# Patient Record
Sex: FEMALE | Race: BLACK OR AFRICAN AMERICAN | NOT HISPANIC OR LATINO | URBAN - METROPOLITAN AREA
[De-identification: names, ages, dates, MRNs, and addresses within clinical notes are randomized per-mention and may not be internally consistent; named-entity substitution may affect disease eponyms.]

---

## 2018-04-03 ENCOUNTER — EMERGENCY (EMERGENCY)
Facility: HOSPITAL | Age: 28
LOS: 1 days | Discharge: HOME | End: 2018-04-03
Attending: EMERGENCY MEDICINE | Admitting: EMERGENCY MEDICINE
Payer: SELF-PAY

## 2018-04-03 VITALS
DIASTOLIC BLOOD PRESSURE: 77 MMHG | TEMPERATURE: 98 F | OXYGEN SATURATION: 100 % | SYSTOLIC BLOOD PRESSURE: 119 MMHG | HEART RATE: 90 BPM | RESPIRATION RATE: 17 BRPM

## 2018-04-03 VITALS
HEART RATE: 70 BPM | DIASTOLIC BLOOD PRESSURE: 78 MMHG | TEMPERATURE: 98 F | SYSTOLIC BLOOD PRESSURE: 124 MMHG | OXYGEN SATURATION: 100 % | RESPIRATION RATE: 16 BRPM

## 2018-04-03 LAB
BASOPHILS # BLD AUTO: 0 K/UL — SIGNIFICANT CHANGE UP (ref 0–0.2)
BASOPHILS NFR BLD AUTO: 0.6 % — SIGNIFICANT CHANGE UP (ref 0–2)
EOSINOPHIL # BLD AUTO: 0.1 K/UL — SIGNIFICANT CHANGE UP (ref 0–0.5)
EOSINOPHIL NFR BLD AUTO: 2.3 % — SIGNIFICANT CHANGE UP (ref 0–6)
HCG SERPL QL: NEGATIVE — SIGNIFICANT CHANGE UP
HCT VFR BLD CALC: 40.2 % — SIGNIFICANT CHANGE UP (ref 34.5–45)
HGB BLD-MCNC: 13.3 G/DL — SIGNIFICANT CHANGE UP (ref 11.5–15.5)
LYMPHOCYTES # BLD AUTO: 2.1 K/UL — SIGNIFICANT CHANGE UP (ref 1–3.3)
LYMPHOCYTES # BLD AUTO: 39.5 % — SIGNIFICANT CHANGE UP (ref 13–44)
MCHC RBC-ENTMCNC: 28.2 PG — SIGNIFICANT CHANGE UP (ref 27–34)
MCHC RBC-ENTMCNC: 33 GM/DL — SIGNIFICANT CHANGE UP (ref 32–36)
MCV RBC AUTO: 85.4 FL — SIGNIFICANT CHANGE UP (ref 80–100)
MONOCYTES # BLD AUTO: 0.5 K/UL — SIGNIFICANT CHANGE UP (ref 0–0.9)
MONOCYTES NFR BLD AUTO: 9.2 % — SIGNIFICANT CHANGE UP (ref 2–14)
NEUTROPHILS # BLD AUTO: 2.5 K/UL — SIGNIFICANT CHANGE UP (ref 1.8–7.4)
NEUTROPHILS NFR BLD AUTO: 48.3 % — SIGNIFICANT CHANGE UP (ref 43–77)
PLATELET # BLD AUTO: 275 K/UL — SIGNIFICANT CHANGE UP (ref 150–400)
RBC # BLD: 4.71 M/UL — SIGNIFICANT CHANGE UP (ref 3.8–5.2)
RBC # FLD: 14.8 % — HIGH (ref 10.3–14.5)
WBC # BLD: 5.2 K/UL — SIGNIFICANT CHANGE UP (ref 3.8–10.5)
WBC # FLD AUTO: 5.2 K/UL — SIGNIFICANT CHANGE UP (ref 3.8–10.5)

## 2018-04-03 PROCEDURE — 85027 COMPLETE CBC AUTOMATED: CPT

## 2018-04-03 PROCEDURE — 99284 EMERGENCY DEPT VISIT MOD MDM: CPT

## 2018-04-03 PROCEDURE — 96375 TX/PRO/DX INJ NEW DRUG ADDON: CPT

## 2018-04-03 PROCEDURE — 96374 THER/PROPH/DIAG INJ IV PUSH: CPT

## 2018-04-03 PROCEDURE — 84703 CHORIONIC GONADOTROPIN ASSAY: CPT

## 2018-04-03 PROCEDURE — 99284 EMERGENCY DEPT VISIT MOD MDM: CPT | Mod: 25

## 2018-04-03 RX ORDER — ACETAMINOPHEN 500 MG
1000 TABLET ORAL ONCE
Qty: 0 | Refills: 0 | Status: COMPLETED | OUTPATIENT
Start: 2018-04-03 | End: 2018-04-03

## 2018-04-03 RX ORDER — METOCLOPRAMIDE HCL 10 MG
10 TABLET ORAL ONCE
Qty: 0 | Refills: 0 | Status: COMPLETED | OUTPATIENT
Start: 2018-04-03 | End: 2018-04-03

## 2018-04-03 RX ORDER — SODIUM CHLORIDE 9 MG/ML
1000 INJECTION INTRAMUSCULAR; INTRAVENOUS; SUBCUTANEOUS ONCE
Qty: 0 | Refills: 0 | Status: COMPLETED | OUTPATIENT
Start: 2018-04-03 | End: 2018-04-03

## 2018-04-03 RX ADMIN — Medication 10 MILLIGRAM(S): at 15:30

## 2018-04-03 RX ADMIN — SODIUM CHLORIDE 1000 MILLILITER(S): 9 INJECTION INTRAMUSCULAR; INTRAVENOUS; SUBCUTANEOUS at 15:30

## 2018-04-03 RX ADMIN — Medication 400 MILLIGRAM(S): at 15:30

## 2018-04-03 NOTE — ED PROVIDER NOTE - CARE PLAN
Principal Discharge DX:	Menorrhagia, premenopausal  Goal:	Follow-up with your PMD and Gynecologist  Assessment and plan of treatment:	Your presented to the emergency department with increased vaginal bleeding during your periods. No concerning abnormalities were found on your pelvic exam, you remained hemodynamically stable, and your blood test demonstrated a Hemoglobin of 13.3. Please follow-up with your gynecologist regarding any additional work-up and treatment options.

## 2018-04-03 NOTE — ED PROVIDER NOTE - PLAN OF CARE
Follow-up with your PMD and Gynecologist Your presented to the emergency department with increased vaginal bleeding during your periods. No concerning abnormalities were found on your pelvic exam, you remained hemodynamically stable, and your blood test demonstrated a Hemoglobin of 13.3. Please follow-up with your gynecologist regarding any additional work-up and treatment options.

## 2018-04-03 NOTE — ED PROVIDER NOTE - OBJECTIVE STATEMENT
28F no significant PMH presents with vaginal bleeding and headache. Patient has history of 3 child births (1 vaginal, 2 ) and one  s/p D&C in . She notes that for the last 3 months she has had significantly heavier periods. In the past she used ~2 pads at a time and changed ~4x/day. Now she wears 4 heavy pads at a time, changed 5x/day, and has to wear extra undergarments as she often soaks through within 2 hrs. She goes to the bathroom and endorses significant blood and passing clots. She has periods every 21 days, no change in frequency. Is sexually active with her boyfriend of 5 years. Denies discharge other than blood. Currently having her period. Not taking birth control or any medications except for Tylenol/Ibuprophen for headache. She endorses some increased shortness of breath during her training recently as a . Has had a headache since Friday associated with photosensitivity but no phonosensitivity. No fevers, chills, CP, LE swelling, palpitations, abdominal pain, N/V, diarrhea, constipation.

## 2018-04-03 NOTE — ED PROVIDER NOTE - PROGRESS NOTE DETAILS
Blood work returned with stable CBC with Hgb 13.3, MCV 85.4. CHUCK No: Patient was endorsed to me by Dr. cronin at the usual hour of signout to follow up results of symptoms and cbc and dispo pending these results and re-evaluation. At this time the patient ha improved, no neuro signs. h/h stable. reviewed results w pt, advised return precautions.

## 2018-04-03 NOTE — ED ADULT TRIAGE NOTE - CHIEF COMPLAINT QUOTE
heavy vaginal bleeding currently on period- saturating 5 pads in 2 hours. +clots c/o intermittent headache, generalized weakness PMHX ovarian cyst

## 2018-04-03 NOTE — ED ADULT NURSE NOTE - CHPI ED SYMPTOMS NEG
no vaginal discharge/no vomiting/no nausea/no fever/no chills/no back pain/no discharge/no dysuria/no abdominal pain

## 2018-04-03 NOTE — ED PROVIDER NOTE - ATTENDING CONTRIBUTION TO CARE
I have seen and evaluated this patient with the resident.   I agree with the findings  unless other wise stated.  I have made appropriate changes in documentations where needed, After my face to face bedside evaluation, I am further  noting: Patient with menorrhagia for last 2 menstrual cycles clots+ no dizziness no syncope no LOC Abdomen soft nontender no cva tenderness labs reviewed , no concerning pathologic findings, CBC stable and HDS will have out patient GYN f/u --Kelly.

## 2018-04-03 NOTE — ED PROVIDER NOTE - MEDICAL DECISION MAKING DETAILS
Patient with menorrhagia, no concerning pathologic findings, CBC stable and HDS. Patient with menorrhagia for last 2 menstrual cycles clots+ no dizziness no syncope no LOC Abdomen soft nontender no cva tenderness labs reviewed , no concerning pathologic findings, CBC stable and HDS  will have out patient GYN f/u --Kelly.

## 2018-08-08 ENCOUNTER — EMERGENCY (EMERGENCY)
Facility: HOSPITAL | Age: 28
LOS: 1 days | Discharge: HOME | End: 2018-08-08
Attending: EMERGENCY MEDICINE | Admitting: EMERGENCY MEDICINE
Payer: SELF-PAY

## 2018-08-08 VITALS
HEART RATE: 86 BPM | OXYGEN SATURATION: 99 % | TEMPERATURE: 99 F | DIASTOLIC BLOOD PRESSURE: 73 MMHG | RESPIRATION RATE: 18 BRPM | SYSTOLIC BLOOD PRESSURE: 122 MMHG

## 2018-08-08 DIAGNOSIS — Z98.891 HISTORY OF UTERINE SCAR FROM PREVIOUS SURGERY: Chronic | ICD-10-CM

## 2018-08-08 PROCEDURE — 99283 EMERGENCY DEPT VISIT LOW MDM: CPT | Mod: 25

## 2018-08-08 PROCEDURE — 99053 MED SERV 10PM-8AM 24 HR FAC: CPT

## 2018-08-08 RX ORDER — METOCLOPRAMIDE HCL 10 MG
5 TABLET ORAL ONCE
Qty: 0 | Refills: 0 | Status: DISCONTINUED | OUTPATIENT
Start: 2018-08-08 | End: 2018-08-08

## 2018-08-08 RX ORDER — KETOROLAC TROMETHAMINE 30 MG/ML
30 SYRINGE (ML) INJECTION ONCE
Qty: 0 | Refills: 0 | Status: DISCONTINUED | OUTPATIENT
Start: 2018-08-08 | End: 2018-08-08

## 2018-08-08 RX ORDER — METOCLOPRAMIDE HCL 10 MG
10 TABLET ORAL ONCE
Qty: 0 | Refills: 0 | Status: COMPLETED | OUTPATIENT
Start: 2018-08-08 | End: 2018-08-08

## 2018-08-08 RX ADMIN — Medication 30 MILLIGRAM(S): at 05:45

## 2018-08-08 RX ADMIN — Medication 10 MILLIGRAM(S): at 05:45

## 2018-08-08 NOTE — ED PROVIDER NOTE - MEDICAL DECISION MAKING DETAILS
28F hx headaches p/w worse than usual HA preventing her from sleeping. Pain much improved w/ toradol and reglan. Pt feels ready to go home.

## 2018-08-08 NOTE — ED ADULT NURSE NOTE - CHIEF COMPLAINT QUOTE
Patient experiencing headache with nausea and light sensitivity for 3 weeks with some relief with Advil.  No fever or vomiting,  Starting experiencing headaches 5 months ago after graduation from [employer].  LMP: Amandeep 10.

## 2018-08-08 NOTE — ED ADULT NURSE NOTE - OBJECTIVE STATEMENT
pt aox4; reports headache and nausea. 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  MD Crowder at bedside. No labs needed. UCG negative, medications administered as per order. Will continue to monitor/assess

## 2018-08-08 NOTE — ED ADULT TRIAGE NOTE - CHIEF COMPLAINT QUOTE
Patient experiencing headache with nausea and light sensitivity for 3 weeks with some relief with Advil.  No vomiting, fever and vomiting,  Starting experiencing headaches 5 months ago after graduation from [employer].  LMP: Amandeep 10. Patient experiencing headache with nausea and light sensitivity for 3 weeks with some relief with Advil.  No fever or vomiting,  Starting experiencing headaches 5 months ago after graduation from [employer].  LMP: Amandeep 10.

## 2018-08-08 NOTE — ED ADULT NURSE NOTE - NSIMPLEMENTINTERV_GEN_ALL_ED
Implemented All Universal Safety Interventions:  Solo to call system. Call bell, personal items and telephone within reach. Instruct patient to call for assistance. Room bathroom lighting operational. Non-slip footwear when patient is off stretcher. Physically safe environment: no spills, clutter or unnecessary equipment. Stretcher in lowest position, wheels locked, appropriate side rails in place.

## 2018-08-08 NOTE — ED PROVIDER NOTE - OBJECTIVE STATEMENT
28F hx headaches presenting w/ HA since 9pm. Pt w/ hx of headaches over last 5 months, managed well previously on aleve but this time aleve relieved the pain but not the pressure so the pt came to the ED. Since the last 3 weeks, she feels her HA are worse, associated with nausea, and radiating from the front to the back, along with bitemporal pressure sensation, retroorbital pain, and jaw pain. 4 weeks ago, her headaches were 28F hx headaches presenting w/ HA since 9pm. Pt w/ hx of headaches over last 5 months, managed well previously on aleve but this time aleve relieved the pain but not the pressure so the pt came to the ED. Since the last 3 weeks, she feels her HA are worse, associated with nausea, and radiating from the front to the back, along with bitemporal pressure sensation, retroorbital pain, and jaw pain. 4 weeks ago, her headaches were frontal and occasionally radiated to the back, associated with photophobia but not nausea. 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 Pt started OCP 3 months ago. Also reports cramping mid abd pain.  No blurry vision, no recent illness, fevers, neck pain, chest pain, SOB, urinary symptoms.    Uhcg negative. Will give reglan and ketorolac and reassess.

## 2018-08-08 NOTE — ED PROVIDER NOTE - ATTENDING CONTRIBUTION TO CARE
Afebrile. Awake and Alert. Lungs CTA. Heart RRR. Abdomen soft NTND. Neurologic exam: A&O x3, speech clear, BIRGIT, CN II-XII intact, motor strength +5/5 in all extremities, sensation equal bilaterally, finger-to-nose normal, gait steady. Supportive care migraine HA and reassess.

## 2018-08-08 NOTE — ED PROVIDER NOTE - FAMILY HISTORY
Family history of hypertension     Mother  Still living? Unknown  Family history of uterine fibroid, Age at diagnosis: Age Unknown

## 2018-12-14 ENCOUNTER — EMERGENCY (EMERGENCY)
Facility: HOSPITAL | Age: 28
LOS: 1 days | Discharge: HOME | End: 2018-12-14
Attending: PERSONAL EMERGENCY RESPONSE ATTENDANT | Admitting: PERSONAL EMERGENCY RESPONSE ATTENDANT
Payer: COMMERCIAL

## 2018-12-14 VITALS
RESPIRATION RATE: 18 BRPM | SYSTOLIC BLOOD PRESSURE: 131 MMHG | TEMPERATURE: 98 F | OXYGEN SATURATION: 100 % | DIASTOLIC BLOOD PRESSURE: 89 MMHG | HEART RATE: 107 BPM

## 2018-12-14 DIAGNOSIS — Z98.891 HISTORY OF UTERINE SCAR FROM PREVIOUS SURGERY: Chronic | ICD-10-CM

## 2018-12-14 PROCEDURE — 99284 EMERGENCY DEPT VISIT MOD MDM: CPT | Mod: 25

## 2018-12-14 NOTE — ED ADULT TRIAGE NOTE - CHIEF COMPLAINT QUOTE
Pt st" Around 8pm my throat feels tight, my tongue feels heavy and my chest is tight....I ate shrimp and lobster at 7pm don't know if this is cause...never had problem before...my jaw feels tight. " Pt took liquid benedryl and phenylephrine at 8pm

## 2018-12-15 VITALS
DIASTOLIC BLOOD PRESSURE: 72 MMHG | HEART RATE: 77 BPM | TEMPERATURE: 99 F | SYSTOLIC BLOOD PRESSURE: 120 MMHG | OXYGEN SATURATION: 100 % | RESPIRATION RATE: 18 BRPM

## 2018-12-15 RX ORDER — DIPHENHYDRAMINE HCL 50 MG
25 CAPSULE ORAL ONCE
Qty: 0 | Refills: 0 | Status: COMPLETED | OUTPATIENT
Start: 2018-12-15 | End: 2018-12-15

## 2018-12-15 RX ORDER — EPINEPHRINE 0.3 MG/.3ML
0.3 INJECTION INTRAMUSCULAR; SUBCUTANEOUS
Qty: 2 | Refills: 0
Start: 2018-12-15

## 2018-12-15 RX ORDER — FAMOTIDINE 10 MG/ML
20 INJECTION INTRAVENOUS ONCE
Qty: 0 | Refills: 0 | Status: COMPLETED | OUTPATIENT
Start: 2018-12-15 | End: 2018-12-15

## 2018-12-15 RX ADMIN — FAMOTIDINE 20 MILLIGRAM(S): 10 INJECTION INTRAVENOUS at 00:34

## 2018-12-15 RX ADMIN — Medication 40 MILLIGRAM(S): at 00:34

## 2018-12-15 RX ADMIN — Medication 25 MILLIGRAM(S): at 00:34

## 2018-12-15 NOTE — ED PROVIDER NOTE - MEDICAL DECISION MAKING DETAILS
Attending MD Nuñez.  Pt endorses ingestion of shrimp/shellfish prior to onset of sxs.  Also took 'ghost' an energy/preworkout drink for the first time today.  Pt has no hx of all rxn to anything that she knows of.  Discussed strict avoidance of all shellfish and do not take ghost again until such time as she can follow-up with an allergist.  Pt managing secretions without airway compromise or distress at this time.  Will administer allergy cocktail but will defer epipen at this time and observe for improvement.  Epipen teaching performed however in anticipation of discharge with epipen despite lack of admin today for concern for near anaphylactic rxn today and poss worsening with next event.  PT advised re: utilization of epipen.  Advised to utilize if concern for allergic exposure with throat swelling/difficulty breathing, wheezing or rash with vomiting.  Epipen to but applied to mid thigh without need to remove pants or cleanse area.  During or immediately after application 911 should be called for immediate transport to nearest ED.  Any epipen can be used in emergency including  epipen or epipen flavio if these are the only options available.  Regardless of improvement in sxs any epipen use warrants immediate presentation to an ED.  If unsure if use is indicated and breathing without distress have someone else drive you to the ED and bring epipen with you for administration in case of worsening sxs en route.  Carry epipen with you at all times particularly when travelling to remote locations.  Follow-up with allergist formal allergy testing.  Avoid any and all exposures that may have lead to current reaction.  Epipen may be used during breast-feeding/pregnancy for above reasons.

## 2018-12-15 NOTE — ED PROVIDER NOTE - PHYSICAL EXAMINATION
Uvula wnl, bilateral tonsils full, managing secretions, no stridor.  No tongue swelling.  Vocalizing per baseline.     Breath sounds clear to auscultation bilaterally without wheezes/rales/ronchi.  No resp distress.

## 2018-12-15 NOTE — ED ADULT NURSE REASSESSMENT NOTE - NS ED NURSE REASSESS COMMENT FT1
report received at shift change from HILARY Young, pt remains AAOx3, VS as noted, pt in NAD, medicated per orders, respirations even and unlabored, resting comfortably, will continue to monitor pt.

## 2018-12-15 NOTE — ED PROVIDER NOTE - ATTENDING CONTRIBUTION TO CARE
Attending MD Nuñez.  Agree with above.  Pt seen and managed by myself and resident in real time.  Initial documentation completed by me.

## 2018-12-15 NOTE — ED PROVIDER NOTE - OBJECTIVE STATEMENT
Attending MD Nuñez.  Pt is a 27 yo female with pmhx of 'bronchitis' (dxed previously, not currently or recent), no hx of asthma and never used a breathing tx.  No other medical problems or known allergies.  She presents to ED today after she ate some shrimp and shellfish this evening and began to feel throat and chest tightness and 'dry mouth' ~2000.  At ~2200 pt took 10 ml liquid children's diphenhydramine (~25 mg) and went to sleep.  Awoke feeling 'worse' and came to ED.  Endorses persistent feeling of 'throat and chest tightness'.  Denies any itching.  Also endorses some nausea with 'gagging' but denies production of emesis.  Pt is alert and oriented on arrival but anxious. States she has never had similar sxs previously.  Talking without dysphonia, managing secretions.  Breath sounds clear.  Bilateral tonsils full however likely baseline and uvula wnl.  No stridor.  Denies possibility of pregnancy.

## 2018-12-15 NOTE — ED PROVIDER NOTE - PROGRESS NOTE DETAILS
Demetri Bo MD PGY4: symptoms improved, no SOB/wheezing at this time, no stridor/drooling, okay for d/c with steroids, benadryl, epipen  rx, return precautions Attending MD Nuñez.  PT advised to follow-up with allergist.  Pt verbalizes understanding of above return precautions and follow-up plan and epipen education.

## 2018-12-15 NOTE — ED PROVIDER NOTE - NSFOLLOWUPINSTRUCTIONS_ED_ALL_ED_FT
Please take benadryl every 8 hours for allergic symptoms. Please take prednisone daily. Please fill your prescription for your epipen. Please review epipen and  allergic reaction discharge paperwork given.

## 2019-01-05 ENCOUNTER — EMERGENCY (EMERGENCY)
Facility: HOSPITAL | Age: 29
LOS: 1 days | Discharge: HOME | End: 2019-01-05
Attending: EMERGENCY MEDICINE
Payer: COMMERCIAL

## 2019-01-05 VITALS
HEIGHT: 62 IN | TEMPERATURE: 98 F | DIASTOLIC BLOOD PRESSURE: 68 MMHG | SYSTOLIC BLOOD PRESSURE: 119 MMHG | HEART RATE: 98 BPM | RESPIRATION RATE: 18 BRPM | OXYGEN SATURATION: 100 % | WEIGHT: 139.99 LBS

## 2019-01-05 VITALS
DIASTOLIC BLOOD PRESSURE: 66 MMHG | HEART RATE: 67 BPM | RESPIRATION RATE: 16 BRPM | OXYGEN SATURATION: 100 % | SYSTOLIC BLOOD PRESSURE: 94 MMHG | TEMPERATURE: 98 F

## 2019-01-05 DIAGNOSIS — Z98.891 HISTORY OF UTERINE SCAR FROM PREVIOUS SURGERY: Chronic | ICD-10-CM

## 2019-01-05 PROCEDURE — 99284 EMERGENCY DEPT VISIT MOD MDM: CPT | Mod: 25

## 2019-01-05 PROCEDURE — 93010 ELECTROCARDIOGRAM REPORT: CPT

## 2019-01-05 RX ORDER — LIDOCAINE 4 G/100G
10 CREAM TOPICAL ONCE
Qty: 0 | Refills: 0 | Status: COMPLETED | OUTPATIENT
Start: 2019-01-05 | End: 2019-01-05

## 2019-01-05 RX ORDER — FAMOTIDINE 10 MG/ML
20 INJECTION INTRAVENOUS ONCE
Qty: 0 | Refills: 0 | Status: COMPLETED | OUTPATIENT
Start: 2019-01-05 | End: 2019-01-05

## 2019-01-05 RX ADMIN — Medication 30 MILLILITER(S): at 23:52

## 2019-01-05 RX ADMIN — LIDOCAINE 10 MILLILITER(S): 4 CREAM TOPICAL at 23:51

## 2019-01-05 NOTE — ED PROVIDER NOTE - NSFOLLOWUPINSTRUCTIONS_ED_ALL_ED_FT
1) Please follow up with your Primary Care Provider in 24-48 hours  2) Seek immediate medical care for any new or returning symptoms including but not limited to severe pain, persistent vomiting, high fevers  3) Take Pepcid once a day for 14 days  4) Please contact Gastroenterology within 24-48

## 2019-01-05 NOTE — ED PROVIDER NOTE - NS ED ROS FT
GENERAL: No fever or chills, //             EYES: no change in vision, //             HEENT: no trouble swallowing or speaking, //             CARDIAC: no chest pain, //              PULMONARY: no cough or SOB, //             GI: abd pain, n/v //             : No changes in urination,  //            SKIN: no rashes,  //            NEURO: no headache,  //             MSK: No joint pain otherwise as HPI or negative. ~Kurt Merrill DO PGY1

## 2019-01-05 NOTE — ED PROVIDER NOTE - NSFOLLOWUPCLINICS_GEN_ALL_ED_FT
[facility]  Gastroenterology  [address] [place] [id number]  Phone: [phone]  Fax:   Follow Up Time:

## 2019-01-05 NOTE — ED PROVIDER NOTE - MEDICAL DECISION MAKING DETAILS
29 yo f pw two week hx of intermittent abd pain. does not appear to be cardiac in nature. labs. sx control. reassess.

## 2019-01-05 NOTE — ED PROVIDER NOTE - PHYSICAL EXAMINATION
General: well appearing, interactive, well nourished, no apparent distress  HEENT: EOMI, PERRLA, normal mucosa, normal oropharynx, no lesions on the lips or on oral mucosa, normal external ear  Neck: supple, no lymphadenopathy, full range of motion, no nuchal rigidity  CV: RRR, normal S1 and S2 with no murmur  Resp: lungs CTA b/l, good aeration bilaterally, symmetric chest wall   Abd: non-distended, soft, ttp in epigastric region/ruq/luq, no rebound, - murphys, -mcburneys  : no CVA tenderness  MSK: full range of motion, no cyanosis, no edema, no clubbing, no immobility  Neuro: cranial nerves intact,  rashes, skin intact

## 2019-01-05 NOTE — ED ADULT NURSE NOTE - OBJECTIVE STATEMENT
epigastric burning x 2-3 weeks; took zantac and prilosec with no relief; did not see md; pt id  fully alert; no acute distress

## 2019-01-05 NOTE — ED PROVIDER NOTE - ATTENDING CONTRIBUTION TO CARE
28 yof asthma, prior c sec w epig burning sensation rads up to throat. states taking zantac and tums w/o relief. sxs x 2 weeks, intermittent, worse after eating. no f/c. sxs mod. no prior GI eval or endoscopy. no cp or sob. no hx or fam hx of     ROS:   constitutional - no fever, no chills  eyes - no visual changes, no redness  eent - no sore throat, no nasal congestion  cvs - no chest pain, no leg swelling  resp - no shortness of breath, no cough  gi - + abdominal pain, no vomiting, no diarrhea  gu - no dysuria, no hematuria  msk - no acute back pain, no joint swelling  skin - no rashes, no jaundice  neuro - no headache, no focal weakness  psych - no acute mental health issue     Physical Exam:   constitutional - well appearing, awake and alert, oriented x3  head - no external evidence of trauma  cvs - rrr, no murmurs, no peripheral edema  resp - breath sounds clear and equal bilat  gi - abdomen soft w mod tenderness, no rigidity, guarding or rebound, bowel sounds present  msk - moving all extremities spontaneously  neuro - alert and oriented x3, no focal deficits, CNs 2-12 grossly intact  skin- no jaundice, warm and dry  psych - mood and affect wnl, no apparent risk to self or others     ? gastritis / gerd vs pancreatitis. NILDA De La Rosa MD 28 yof asthma, prior c sec w epig burning sensation rads up to throat. states taking zantac and tums w/o relief. sxs x 2 weeks, intermittent, worse after eating. no f/c. sxs mod. no prior GI eval or endoscopy. no cp or sob. no hx or fam hx of     ROS:   constitutional - no fever, no chills  eyes - no visual changes, no redness  eent - no sore throat, no nasal congestion  cvs - no chest pain, no leg swelling  resp - no shortness of breath, no cough  gi - + abdominal pain, no vomiting, no diarrhea  gu - no dysuria, no hematuria  msk - no acute back pain, no joint swelling  skin - no rashes, no jaundice  neuro - no headache, no focal weakness  psych - no acute mental health issue     Physical Exam:   constitutional - well appearing, awake and alert, oriented x3  head - no external evidence of trauma  cvs - rrr, no murmurs, no peripheral edema  resp - breath sounds clear and equal bilat  gi - abdomen soft w mod tenderness, no rigidity, guarding or rebound, bowel sounds present  msk - moving all extremities spontaneously  neuro - alert and oriented x3, no focal deficits, CNs 2-12 grossly intact  skin- no jaundice, warm and dry  psych - mood and affect wnl, no apparent risk to self or others     ? gastritis / gerd vs pancreatitis.   will refer to GI for endoscopy, start on pepcid. additional verbal instructions regarding diagnosis, return precautions and follow up plan given to pt and/or family. NILDA De La Rosa MD

## 2019-01-05 NOTE — ED PROVIDER NOTE - OBJECTIVE STATEMENT
27 yo f pmh asthma pw 2 week hx of intermittent epigastric pain. pt states pain begins in epigastrium, radiates up to mouth, burning quality, 8/10 currently, worse with food, better with zantac/tums. pt states she came today because meds no longer provided relief. reports one ep non-bili, non-bloody emesis today. denies cp, sob, urinary sx, vaginal dc, vaginal bleeding. reports having normal BMs, passing gas. denies sob on exertion, cp on exertion.

## 2019-01-05 NOTE — ED PROVIDER NOTE - CARE PLAN
Assessment and plan of treatment:	1) Please follow up with your Primary Care Provider in 24-48 hours  2) Seek immediate medical care for any new or returning symptoms including but not limited to severe pain, persistent vomiting, high fevers  3) Take Pepcid once a day for 14 days  4) Please contact Gastroenterology within 24-48 Principal Discharge DX:	Abdominal pain, epigastric  Assessment and plan of treatment:	1) Please follow up with your Primary Care Provider in 24-48 hours  2) Seek immediate medical care for any new or returning symptoms including but not limited to severe pain, persistent vomiting, high fevers  3) Take Pepcid once a day for 14 days  4) Please contact Gastroenterology within 24-48

## 2019-01-05 NOTE — ED PROVIDER NOTE - PROGRESS NOTE DETAILS
Patient reassessed, NAD, non-toxic appearing. pt reports pain decreased. labs reviewed with pt and family, questions answered. feels comfortable for dc.   - Kurt Merrill D.O. PGY1

## 2019-01-06 LAB
ALBUMIN SERPL ELPH-MCNC: 4.4 G/DL — SIGNIFICANT CHANGE UP (ref 3.3–5)
ALP SERPL-CCNC: 69 U/L — SIGNIFICANT CHANGE UP (ref 40–120)
ALT FLD-CCNC: 13 U/L — SIGNIFICANT CHANGE UP (ref 10–45)
ANION GAP SERPL CALC-SCNC: 13 MMOL/L — SIGNIFICANT CHANGE UP (ref 5–17)
AST SERPL-CCNC: 15 U/L — SIGNIFICANT CHANGE UP (ref 10–40)
BASOPHILS # BLD AUTO: 0 K/UL — SIGNIFICANT CHANGE UP (ref 0–0.2)
BASOPHILS NFR BLD AUTO: 0.6 % — SIGNIFICANT CHANGE UP (ref 0–2)
BILIRUB SERPL-MCNC: 0.2 MG/DL — SIGNIFICANT CHANGE UP (ref 0.2–1.2)
BUN SERPL-MCNC: 13 MG/DL — SIGNIFICANT CHANGE UP (ref 7–23)
CALCIUM SERPL-MCNC: 9.1 MG/DL — SIGNIFICANT CHANGE UP (ref 8.4–10.5)
CHLORIDE SERPL-SCNC: 101 MMOL/L — SIGNIFICANT CHANGE UP (ref 96–108)
CO2 SERPL-SCNC: 24 MMOL/L — SIGNIFICANT CHANGE UP (ref 22–31)
CREAT SERPL-MCNC: 0.81 MG/DL — SIGNIFICANT CHANGE UP (ref 0.5–1.3)
EOSINOPHIL # BLD AUTO: 0.1 K/UL — SIGNIFICANT CHANGE UP (ref 0–0.5)
EOSINOPHIL NFR BLD AUTO: 1.7 % — SIGNIFICANT CHANGE UP (ref 0–6)
GLUCOSE SERPL-MCNC: 94 MG/DL — SIGNIFICANT CHANGE UP (ref 70–99)
HCT VFR BLD CALC: 40.2 % — SIGNIFICANT CHANGE UP (ref 34.5–45)
HGB BLD-MCNC: 13.7 G/DL — SIGNIFICANT CHANGE UP (ref 11.5–15.5)
LIDOCAIN IGE QN: 26 U/L — SIGNIFICANT CHANGE UP (ref 7–60)
LYMPHOCYTES # BLD AUTO: 2.4 K/UL — SIGNIFICANT CHANGE UP (ref 1–3.3)
LYMPHOCYTES # BLD AUTO: 37.4 % — SIGNIFICANT CHANGE UP (ref 13–44)
MCHC RBC-ENTMCNC: 29.6 PG — SIGNIFICANT CHANGE UP (ref 27–34)
MCHC RBC-ENTMCNC: 34 GM/DL — SIGNIFICANT CHANGE UP (ref 32–36)
MCV RBC AUTO: 87.1 FL — SIGNIFICANT CHANGE UP (ref 80–100)
MONOCYTES # BLD AUTO: 0.7 K/UL — SIGNIFICANT CHANGE UP (ref 0–0.9)
MONOCYTES NFR BLD AUTO: 10.5 % — SIGNIFICANT CHANGE UP (ref 2–14)
NEUTROPHILS # BLD AUTO: 3.2 K/UL — SIGNIFICANT CHANGE UP (ref 1.8–7.4)
NEUTROPHILS NFR BLD AUTO: 49.8 % — SIGNIFICANT CHANGE UP (ref 43–77)
PLATELET # BLD AUTO: 257 K/UL — SIGNIFICANT CHANGE UP (ref 150–400)
POTASSIUM SERPL-MCNC: 4.1 MMOL/L — SIGNIFICANT CHANGE UP (ref 3.5–5.3)
POTASSIUM SERPL-SCNC: 4.1 MMOL/L — SIGNIFICANT CHANGE UP (ref 3.5–5.3)
PROT SERPL-MCNC: 7.8 G/DL — SIGNIFICANT CHANGE UP (ref 6–8.3)
RBC # BLD: 4.61 M/UL — SIGNIFICANT CHANGE UP (ref 3.8–5.2)
RBC # FLD: 12.7 % — SIGNIFICANT CHANGE UP (ref 10.3–14.5)
SODIUM SERPL-SCNC: 138 MMOL/L — SIGNIFICANT CHANGE UP (ref 135–145)
WBC # BLD: 6.5 K/UL — SIGNIFICANT CHANGE UP (ref 3.8–10.5)
WBC # FLD AUTO: 6.5 K/UL — SIGNIFICANT CHANGE UP (ref 3.8–10.5)

## 2019-01-06 PROCEDURE — 83690 ASSAY OF LIPASE: CPT

## 2019-01-06 PROCEDURE — 80053 COMPREHEN METABOLIC PANEL: CPT

## 2019-01-06 PROCEDURE — 99284 EMERGENCY DEPT VISIT MOD MDM: CPT | Mod: 25

## 2019-01-06 PROCEDURE — 85027 COMPLETE CBC AUTOMATED: CPT

## 2019-01-06 PROCEDURE — 93005 ELECTROCARDIOGRAM TRACING: CPT

## 2019-01-06 PROCEDURE — 96374 THER/PROPH/DIAG INJ IV PUSH: CPT

## 2019-01-06 RX ORDER — FAMOTIDINE 10 MG/ML
1 INJECTION INTRAVENOUS
Qty: 14 | Refills: 0
Start: 2019-01-06 | End: 2019-01-19

## 2019-01-06 RX ADMIN — FAMOTIDINE 20 MILLIGRAM(S): 10 INJECTION INTRAVENOUS at 00:08

## 2019-11-26 ENCOUNTER — EMERGENCY (EMERGENCY)
Facility: HOSPITAL | Age: 29
LOS: 1 days | Discharge: HOME | End: 2019-11-26
Attending: EMERGENCY MEDICINE | Admitting: EMERGENCY MEDICINE
Payer: COMMERCIAL

## 2019-11-26 VITALS
TEMPERATURE: 99 F | RESPIRATION RATE: 18 BRPM | SYSTOLIC BLOOD PRESSURE: 133 MMHG | DIASTOLIC BLOOD PRESSURE: 92 MMHG | HEART RATE: 87 BPM | OXYGEN SATURATION: 100 %

## 2019-11-26 DIAGNOSIS — Z98.891 HISTORY OF UTERINE SCAR FROM PREVIOUS SURGERY: Chronic | ICD-10-CM

## 2019-11-26 PROCEDURE — 99283 EMERGENCY DEPT VISIT LOW MDM: CPT

## 2019-11-26 RX ORDER — DEXAMETHASONE 0.5 MG/5ML
8 ELIXIR ORAL ONCE
Refills: 0 | Status: COMPLETED | OUTPATIENT
Start: 2019-11-26 | End: 2019-11-26

## 2019-11-26 RX ORDER — IBUPROFEN 200 MG
400 TABLET ORAL ONCE
Refills: 0 | Status: COMPLETED | OUTPATIENT
Start: 2019-11-26 | End: 2019-11-26

## 2019-11-26 RX ORDER — PENICILLIN G BENZATHINE 1200000 [IU]/2ML
1.2 INJECTION, SUSPENSION INTRAMUSCULAR ONCE
Refills: 0 | Status: COMPLETED | OUTPATIENT
Start: 2019-11-26 | End: 2019-11-26

## 2019-11-26 RX ADMIN — Medication 400 MILLIGRAM(S): at 23:27

## 2019-11-26 RX ADMIN — Medication 8 MILLIGRAM(S): at 23:27

## 2019-11-26 RX ADMIN — PENICILLIN G BENZATHINE 1.2 MILLION UNIT(S): 1200000 INJECTION, SUSPENSION INTRAMUSCULAR at 23:27

## 2019-11-26 NOTE — ED ADULT TRIAGE NOTE - NS ED NURSE BANDS TYPE
Name band; Condition:: Filler/btx-a tx Please Describe Your Condition:: comes in for Filler/btx-a tx . No known contraindications to soft tissue augmentation with CORRINE; no known contraindications to treatment with botulinum toxin. See “Cosmetic Procedure” note in Attachments.

## 2019-11-26 NOTE — ED PROVIDER NOTE - PATIENT PORTAL LINK FT
You can access the FollowMyHealth Patient Portal offered by [facility] by registering at the following website: [email or web address]. By joining [employer]’s FollowMyHealth portal, you will also be able to view your health information using other applications (apps) compatible with our system.

## 2019-11-26 NOTE — ED PROVIDER NOTE - OBJECTIVE STATEMENT
29f presents with throat pain. Patient had previously had mild cough and throat pain, was started on z-brittany and promethazine with improvement of cough. Today throat pain got significantly worse, constant, worse with swallowing. No associated fevers, chills, ha, cp, neck pain, sob, abd pain, vomiting, diarrhea, dysuria, le edema. Patient has child who is sick and admitted to [facility] [place] [place] and also has sick exposure at her job.

## 2019-11-26 NOTE — ED PROVIDER NOTE - NS ED ROS FT
ROS:  GENERAL: No fever, no chills  EYES: no change in vision  HEENT: +throat pain, no trouble speaking  CARDIAC: no chest pain  PULMONARY: + cough now resolved, no shortness of breath  GI: no abdominal pain, no nausea, no vomiting, no diarrhea, no constipation  : No dysuria, no frequency, no change in appearance, or odor of urine  SKIN: no rashes  NEURO: no headache, no weakness  MSK: No joint pain

## 2019-11-26 NOTE — ED PROVIDER NOTE - CLINICAL SUMMARY MEDICAL DECISION MAKING FREE TEXT BOX
Patient presents to the ED with Worsening throat pain, known sick contacts, with pharyngitis/tonsillitis on exam, airway patent, otherwise well appearing, neck supple, vss. Will give Bicillin x 1, Decadron and pain ctrl for symptoms, dc with pcp f/u. Return precautions discussed.

## 2019-11-26 NOTE — ED PROVIDER NOTE - PHYSICAL EXAMINATION
GEN - NAD; well appearing; A+O x3   HEAD - NC/AT   EYES- PERRL, EOMI  ENT: Airway patent, mmm, +b/l tonsillar swelling with overlying exudates, no ulcers/vesicles, uvula midline with no swelling, no stridor/drooling/pooling/voice change, no tongue elevation  NECK: Neck supple, +ant cerv lymphadenopathy, FROM  PULMONARY - CTA b/l, symmetric breath sounds.   CARDIAC -s1s2, RRR, no M,G,R  ABDOMEN - +BS, ND, NT, soft  BACK - no CVA tenderness  EXTREMITIES - FROM, no edema   SKIN - no rash or bruising   NEUROLOGIC - alert, speech clear, no focal deficits  PSYCH -nl mood/affect, nl insight.
OB/GYN

## 2019-11-26 NOTE — ED PROVIDER NOTE - NSFOLLOWUPINSTRUCTIONS_ED_ALL_ED_FT
You were seen in the Emergency Department for throat pain and were diagnosed with tonsillitis/pharyngitis. You received Bicillin and Decadron ( a one time steroid to help your throat pain). Please take ibuprofen 400mg every 6 hours as needed for pain with food.  Please take tylenol 650mg every 4 hours as needed for pain.  Please follow up with your primary doctor in the next 48 hours.  Please return to the ED for worsening pain, fevers, weakness, inability to speak or swallow, or any new/worse problem.

## 2019-11-26 NOTE — ED ADULT TRIAGE NOTE - CHIEF COMPLAINT QUOTE
Pt. c/o sore throat and dry cough x 2 weeks, seen by PMD and given promethazine and azithromycin without relief. States her son is admitted to hospital and dx with HMPV. Denies fever, chills. Respirations even & unlabored. Speaking in full sentences without difficulty. No drooling or stridor noted. Pt. c/o sore throat and dry cough x 2 weeks, seen by PMD and given promethazine and azithromycin without relief. States her son is admitted to hospital and dx with HMPV and her symptoms are not simliar to his. Denies fever, chills. Respirations even & unlabored. Speaking in full sentences without difficulty. No drooling or stridor noted.

## 2020-03-03 ENCOUNTER — RESULT REVIEW (OUTPATIENT)
Age: 30
End: 2020-03-03

## 2020-04-01 ENCOUNTER — TRANSCRIPTION ENCOUNTER (OUTPATIENT)
Age: 30
End: 2020-04-01

## 2020-07-31 ENCOUNTER — EMERGENCY (EMERGENCY)
Facility: HOSPITAL | Age: 30
LOS: 1 days | Discharge: HOME | End: 2020-07-31
Attending: STUDENT IN AN ORGANIZED HEALTH CARE EDUCATION/TRAINING PROGRAM | Admitting: STUDENT IN AN ORGANIZED HEALTH CARE EDUCATION/TRAINING PROGRAM
Payer: COMMERCIAL

## 2020-07-31 VITALS
OXYGEN SATURATION: 100 % | RESPIRATION RATE: 17 BRPM | DIASTOLIC BLOOD PRESSURE: 81 MMHG | HEART RATE: 95 BPM | SYSTOLIC BLOOD PRESSURE: 128 MMHG | TEMPERATURE: 99 F

## 2020-07-31 DIAGNOSIS — Z98.891 HISTORY OF UTERINE SCAR FROM PREVIOUS SURGERY: Chronic | ICD-10-CM

## 2020-07-31 DIAGNOSIS — N83.209 UNSPECIFIED OVARIAN CYST, UNSPECIFIED SIDE: ICD-10-CM

## 2020-07-31 LAB
ALBUMIN SERPL ELPH-MCNC: 3.8 G/DL — SIGNIFICANT CHANGE UP (ref 3.3–5)
ALP SERPL-CCNC: 66 U/L — SIGNIFICANT CHANGE UP (ref 40–120)
ALT FLD-CCNC: 21 U/L — SIGNIFICANT CHANGE UP (ref 4–33)
ANION GAP SERPL CALC-SCNC: 15 MMO/L — HIGH (ref 7–14)
APPEARANCE UR: SIGNIFICANT CHANGE UP
AST SERPL-CCNC: 26 U/L — SIGNIFICANT CHANGE UP (ref 4–32)
BACTERIA # UR AUTO: SIGNIFICANT CHANGE UP
BASOPHILS # BLD AUTO: 0.01 K/UL — SIGNIFICANT CHANGE UP (ref 0–0.2)
BASOPHILS NFR BLD AUTO: 0.1 % — SIGNIFICANT CHANGE UP (ref 0–2)
BILIRUB SERPL-MCNC: 0.2 MG/DL — SIGNIFICANT CHANGE UP (ref 0.2–1.2)
BILIRUB UR-MCNC: NEGATIVE — SIGNIFICANT CHANGE UP
BLD GP AB SCN SERPL QL: NEGATIVE — SIGNIFICANT CHANGE UP
BLOOD UR QL VISUAL: NEGATIVE — SIGNIFICANT CHANGE UP
BUN SERPL-MCNC: 8 MG/DL — SIGNIFICANT CHANGE UP (ref 7–23)
CALCIUM SERPL-MCNC: 8.8 MG/DL — SIGNIFICANT CHANGE UP (ref 8.4–10.5)
CHLORIDE SERPL-SCNC: 101 MMOL/L — SIGNIFICANT CHANGE UP (ref 98–107)
CO2 SERPL-SCNC: 19 MMOL/L — LOW (ref 22–31)
COLOR SPEC: YELLOW — SIGNIFICANT CHANGE UP
CREAT SERPL-MCNC: 0.63 MG/DL — SIGNIFICANT CHANGE UP (ref 0.5–1.3)
EOSINOPHIL # BLD AUTO: 0.02 K/UL — SIGNIFICANT CHANGE UP (ref 0–0.5)
EOSINOPHIL NFR BLD AUTO: 0.3 % — SIGNIFICANT CHANGE UP (ref 0–6)
GLUCOSE SERPL-MCNC: 78 MG/DL — SIGNIFICANT CHANGE UP (ref 70–99)
GLUCOSE UR-MCNC: NEGATIVE — SIGNIFICANT CHANGE UP
HCG SERPL-ACNC: SIGNIFICANT CHANGE UP MIU/ML
HCT VFR BLD CALC: 40.5 % — SIGNIFICANT CHANGE UP (ref 34.5–45)
HGB BLD-MCNC: 13.1 G/DL — SIGNIFICANT CHANGE UP (ref 11.5–15.5)
IMM GRANULOCYTES NFR BLD AUTO: 0.3 % — SIGNIFICANT CHANGE UP (ref 0–1.5)
KETONES UR-MCNC: HIGH
LEUKOCYTE ESTERASE UR-ACNC: NEGATIVE — SIGNIFICANT CHANGE UP
LYMPHOCYTES # BLD AUTO: 1.84 K/UL — SIGNIFICANT CHANGE UP (ref 1–3.3)
LYMPHOCYTES # BLD AUTO: 25.5 % — SIGNIFICANT CHANGE UP (ref 13–44)
MCHC RBC-ENTMCNC: 28.4 PG — SIGNIFICANT CHANGE UP (ref 27–34)
MCHC RBC-ENTMCNC: 32.3 % — SIGNIFICANT CHANGE UP (ref 32–36)
MCV RBC AUTO: 87.7 FL — SIGNIFICANT CHANGE UP (ref 80–100)
MONOCYTES # BLD AUTO: 0.49 K/UL — SIGNIFICANT CHANGE UP (ref 0–0.9)
MONOCYTES NFR BLD AUTO: 6.8 % — SIGNIFICANT CHANGE UP (ref 2–14)
NEUTROPHILS # BLD AUTO: 4.84 K/UL — SIGNIFICANT CHANGE UP (ref 1.8–7.4)
NEUTROPHILS NFR BLD AUTO: 67 % — SIGNIFICANT CHANGE UP (ref 43–77)
NITRITE UR-MCNC: NEGATIVE — SIGNIFICANT CHANGE UP
NRBC # FLD: 0 K/UL — SIGNIFICANT CHANGE UP (ref 0–0)
PH UR: 6 — SIGNIFICANT CHANGE UP (ref 5–8)
PLATELET # BLD AUTO: 260 K/UL — SIGNIFICANT CHANGE UP (ref 150–400)
PMV BLD: 9.8 FL — SIGNIFICANT CHANGE UP (ref 7–13)
POTASSIUM SERPL-MCNC: 4.4 MMOL/L — SIGNIFICANT CHANGE UP (ref 3.5–5.3)
POTASSIUM SERPL-SCNC: 4.4 MMOL/L — SIGNIFICANT CHANGE UP (ref 3.5–5.3)
PROT SERPL-MCNC: 7.7 G/DL — SIGNIFICANT CHANGE UP (ref 6–8.3)
PROT UR-MCNC: 50 — SIGNIFICANT CHANGE UP
RBC # BLD: 4.62 M/UL — SIGNIFICANT CHANGE UP (ref 3.8–5.2)
RBC # FLD: 14.7 % — HIGH (ref 10.3–14.5)
RBC CASTS # UR COMP ASSIST: SIGNIFICANT CHANGE UP (ref 0–?)
RH IG SCN BLD-IMP: POSITIVE — SIGNIFICANT CHANGE UP
SODIUM SERPL-SCNC: 135 MMOL/L — SIGNIFICANT CHANGE UP (ref 135–145)
SP GR SPEC: 1.03 — SIGNIFICANT CHANGE UP (ref 1–1.04)
SQUAMOUS # UR AUTO: SIGNIFICANT CHANGE UP
UROBILINOGEN FLD QL: NORMAL — SIGNIFICANT CHANGE UP
WBC # BLD: 7.22 K/UL — SIGNIFICANT CHANGE UP (ref 3.8–10.5)
WBC # FLD AUTO: 7.22 K/UL — SIGNIFICANT CHANGE UP (ref 3.8–10.5)
WBC UR QL: SIGNIFICANT CHANGE UP (ref 0–?)

## 2020-07-31 PROCEDURE — 99285 EMERGENCY DEPT VISIT HI MDM: CPT

## 2020-07-31 PROCEDURE — 76817 TRANSVAGINAL US OBSTETRIC: CPT | Mod: 26

## 2020-07-31 RX ORDER — ACETAMINOPHEN 500 MG
650 TABLET ORAL ONCE
Refills: 0 | Status: COMPLETED | OUTPATIENT
Start: 2020-07-31 | End: 2020-07-31

## 2020-07-31 RX ORDER — IBUPROFEN 200 MG
600 TABLET ORAL ONCE
Refills: 0 | Status: DISCONTINUED | OUTPATIENT
Start: 2020-07-31 | End: 2020-07-31

## 2020-07-31 NOTE — ED ADULT NURSE NOTE - OBJECTIVE STATEMENT
Received pt into spot #12 in intake area. Pt A/o x 4 calm cooperative, no acute distress noted. Pt c/o right pelvic pain x 1 month. Denies any N/V/D. Pt currently on birth control pills so she states she always has monthly vaginal spotting and has still been getting spotting now. UCG came back as positive here in ED. Blood work obtained as ordered. Pt is . Awaiting U/S. Pt aware.

## 2020-07-31 NOTE — ED PROVIDER NOTE - PROGRESS NOTE DETAILS
Informed patient on positive pregnancy hcg results Blanca, PGY1 – Pt was re-evaluated at bedside, VSS, feeling well overall. Pain is improved after tylenol. Spoke with OB, who said that pt was fine to go home and follow up with their OB. Pt instructed to stop birth control pills and to take tylenol for pain, and follow up with their OB. Return precautions and follow up plan with PCP and/or specialist were discussed. Time was taken to answer any questions that the patient had before providing them with discharge paperwork.

## 2020-07-31 NOTE — ED PROVIDER NOTE - ATTENDING CONTRIBUTION TO CARE
I performed a history and physical exam of the patient and discussed their management with the resident.  I reviewed the resident's note and agree with the documented findings and plan of care except as noted below. My medical decision making and observations are as follows:    29 yo F with hx of asthma  c/o 1 month hx worsening abdominal pain. Pain is described as sharp, throbbing, and pulling sensation and is localized to the lower abdomen b/l RLQ>LLQ. She had an appointment with her OB on July 13 where she was told she had a UTI and was discharged on nitrofurantoin bidx7 days - she finished course of abx yesterday but denies any improvement of sxs. denies f/c, nausea/vomiting/diarrhea. Denies bleeding but noticed yellowish vaginal discharge since yesterday.  Pt's abd is soft but +TTP in b/l lower quadrants R> L,  exam as documented.  concern for possible PID, ectopic.  Less likely appendicitis or torsion given symptoms have been present for 1 month constantly.  Will plan for ua, labs, TVUS and reassess need for further imaging.

## 2020-07-31 NOTE — ED ADULT TRIAGE NOTE - CHIEF COMPLAINT QUOTE
Pt presenting to ED for right sided pelvic pain. Pt states she was dx with a "bladder infection" one month ago. Pt finished ABX with no relief. Pt denies blood in urine/ fevers

## 2020-07-31 NOTE — ED PROVIDER NOTE - NSFOLLOWUPINSTRUCTIONS_ED_ALL_ED_FT
You were seen for abdominal pain, which is likely due to hemorrhagic ovarian cyst found on ultrasound. Stop taking your birth control pills and follow up with your OBGYN Dr Begum within the next 3-5 days.     For pain  tylenol as needed, as directed on packaging.     If you had labs or imaging done, you were given copies of all of the available results. If anything is pending to result or pending official read, you will receive a call if results are positive.    If needed, call patient access services at [phone] to find a primary care doctor, or call at [phone] to make an appointment at the clinic.    Return to the ER for any worsening symptoms or concerns, including worsening abdominal pain, chest pain, shortness of breath, fever, chills.

## 2020-07-31 NOTE — ED PROVIDER NOTE - PATIENT PORTAL LINK FT
You can access the FollowMyHealth Patient Portal offered by [facility] by registering at the following website: [email or web address]. By joining [employer]’s FollowMyHealth portal, you will also be able to view your health information using other applications (apps) compatible with our system.

## 2020-07-31 NOTE — CONSULT NOTE ADULT - ASSESSMENT
30y  LMP unknown with PMH asthma presents with abdominal pain x 3w s/p empiric treatment for UTI 2w ago. Patient found to be pregnant (AUNDREA 2021 by first sono today) at 13w0d EGA, also have small L ovarian cyst. Pain is likely 2/2 leaking v ruptured ovarian cyst given free fluid in posterior cul de sac, chronicity of pain, improvement with over-the-counter oral analgesics, and overall well-appearing stable patient. Patient is a good candidate for outpatient followup.    - Patient confirmed that pregnancy is desired, counseled to stop taking DAGOBERTO  - Patient to call private ob/gyn office Monday for first prenatal appointment  - Ovarian cyst will resolve spontaneously, stable for expectant mgmt.    d/w Dr. Corinna Bender PGY3

## 2020-07-31 NOTE — CONSULT NOTE ADULT - SUBJECTIVE AND OBJECTIVE BOX
WILLIAM GRAHAM  30y  Female [id number]    HPI: 30y  LMP unknown with PMH asthma presents with abdominal pain x 3w s/p empiric treatment for UTI 2w ago.    Patient reports that abdominal pain was sudden onset 3w ago, and has been persistent since that time. Pain is b/l, R>L, constant, sharp. No vaginal bleeding or discharge. Patient denies lightheadedness, shortness of breath, chest pain, and palpitations. Patient denies fever or chills. Patient was treated empirically for UTI when she presented to her Gyn 2w ago, was unable to produce a urine sample at that time. Patient took macrobid as prescribed with no improvement in symptoms. Patient has been on DAGOBERTO for several years, only remote h/o menses.    Name of GYN Physician: Dr. Weinstein    - POB:  x 1, LTCS x 2 (, most recent, at [facility], c/b "infection," hospitalized and on vancomycin x 2w)  - Pgyn: Denies fibroids, endometriosis, STI's, Abnormal pap smears. +remote h/o cysts, not requiring surgical intervention.  - PMH: asthma  - PSH: LTCS x 2  - Meds: albuterol PRN  - All: gentamicin (Rash), latex (Blisters; Rash; Swelling), Vancomycin Hydrochloride (Rash)  - Soc: neg x 3  - FH: No h/o VTE. No h/o familial or gyn cancers (no breast, ovarian, uterine, gastric, pancreatic, skin, or eye)    REVIEW OF SYSTEMS      General: Denies fever, chills, weakness	    Skin/Breast: Denies breast pain  	  Respiratory and Thorax: Denies SOB, denies cough  	  Cardiovascular: Denies chest pain or palpitations    Gastrointestinal: Denies nausea/vomiting. Denies diarrhea    Genitourinary: Denies dysuria, increased urinary frequency, urgency	    Constitutional, Cardiovascular, Respiratory, Gastrointestinal, Genitourinary, Musculoskeletal, Neurological, and Integumentary review of systems are normal except as noted     Hospital Meds:   MEDICATIONS  (STANDING):    MEDICATIONS  (PRN):        Vital Signs Last 24 Hrs  T(C): 37.2 (2020 12:32), Max: 37.2 (2020 12:32)  T(F): 99 (2020 12:32), Max: 99 (2020 12:32)  HR: 95 (2020 12:32) (95 - 95)  BP: 128/81 (2020 12:32) (128/81 - 128/81)  BP(mean): --  RR: 17 (2020 12:32) (17 - 17)  SpO2: 100% (2020 12:32) (100% - 100%)    Physical Exam:   General: sitting comfortably in bed, NAD   HEENT: neck supple, full ROM  CV: RR S1S2 no m/r/g  Lungs: CTA b/l, good air flow b/l   Back: No CVA tenderness  Abd: Soft, mild TTP b/l LQ (R>L), non-distended.  Bowel sounds present.    :  No bleeding on pad. External labia wnl.    Speculum Exam: Physiologic discharge.    Bimanual Exam: +posterior cul de sac tenderness  Ext: non-tender b/l, no edema     LABS:      HCG Quantitative, Serum: 00192 mIU/mL (20 @ 14:10)                                  13.1   7.22  )-----------( 260      ( 2020 14:10 )             40.5         135  |  101  |  8   ----------------------------<  78  4.4   |  19<L>  |  0.63    Ca    8.8      2020 14:10    TPro  7.7  /  Alb  3.8  /  TBili  0.2  /  DBili  x   /  AST  26  /  ALT  21  /  AlkPhos  66      I&O's Detail      Urinalysis Basic - ( 2020 14:10 )    Color: YELLOW / Appearance: Lt TURBID / S.033 / pH: 6.0  Gluc: NEGATIVE / Ketone: MODERATE  / Bili: NEGATIVE / Urobili: NORMAL   Blood: NEGATIVE / Protein: 50 / Nitrite: NEGATIVE   Leuk Esterase: NEGATIVE / RBC: 0-2 / WBC 3-5   Sq Epi: MODERATE / Non Sq Epi: x / Bacteria: FEW        RADIOLOGY & ADDITIONAL STUDIES:    < from: US Transvaginal, OB (20 @ 14:43) >    EXAM:  US OB TRANSVAGINAL        PROCEDURE DATE:  2020         INTERPRETATION:  CLINICAL INFORMATION: Right pelvic pain. Evaluate for ectopic pregnancy. Right adnexal pain for one month.    LMP: Unknown LMP due to birth control.    EstimatedGestational Age by LMP: N/A    COMPARISON: Ultrasound dated 2015.    Endovaginal pelvic sonogram only.    FINDINGS:  Uterus: There is an intrauterine gestation.    Crown Rump Length: 6.7 cm  Estimated Gestational Age: 13 weeks 0 days.  Fetal Heart Rate: 162 bpm.    Right ovary: Not visualized.  Left ovary: 5.1 x 2.9 x 4.5 cm. 2.3 x 2.6 x 2.5 complex left ovarian cyst, likely hemorrhagic.    Fluid: Small amount of free fluid in the cul de sac.    IMPRESSION:  Single live intrauterine gestationwith an estimated gestational age of 13 weeks estimated by crown-rump length.    Left hemorrhagic ovarian cyst.                KATHIA SOLORIO M.D., RADIOLOGY RESIDENT  This document has been electronically signed.  SAMM PIMENTEL M.D., ATTENDING RADIOLOGIST  This document has been electronically signed. 2020  3:11PM                  < end of copied text >

## 2020-07-31 NOTE — ED PROVIDER NOTE - PHYSICAL EXAMINATION
Gen: WDWN, NAD  HEENT: EOMI, no nasal discharge, mucous membranes moist  CV: RRR, +S1/S2, no M/R/G  Resp: CTAB, no W/R/R  GI: Abdomen soft, non-distended. Tenderness to palpation RUQ, RLQ>LLQ.   MSK: No open wounds, no bruising, no lower extremity edema  Neuro: A&Ox4, following commands, moving all four extremities spontaneously  Psych: appropriate mood    Pelvic exam - chaperoned by Dr Sung: edema and erythema of the cervix on the R. Scant yellowish discharge from the cervix. Bimanual exam with tenderness on the R side Gen: WDWN, NAD  HEENT: EOMI, no nasal discharge, mucous membranes moist  CV: RRR, +S1/S2, no M/R/G  Resp: CTAB, no W/R/R  GI: Abdomen soft, non-distended. Tenderness to palpation RUQ, RLQ>LLQ.   MSK: No open wounds, no bruising, no lower extremity edema  Neuro: A&Ox4, following commands, moving all four extremities spontaneously  Psych: appropriate mood    Pelvic exam - chaperoned by Dr Sung: edema of the cervix on the R. Scant yellowish discharge from the cervix. Bimanual exam with tenderness on the R side

## 2020-07-31 NOTE — ED PROVIDER NOTE - CLINICAL SUMMARY MEDICAL DECISION MAKING FREE TEXT BOX
29 yo c/o abdominal + pelvic pain with edematous cervix concerning for cervicitis/PID. Ddx to consider in this patient includes ectopic pregnancy given pain location and positive hcg. Will get transvaginal OB u/s to evaluate for PID, ectopic, TOA. Pelvic exam + cervical swab was performed - send for GC/NT cx. Labs, IVF. Consider CT imaging pending U/S results if inconclusive.

## 2020-07-31 NOTE — ED PROVIDER NOTE - OBJECTIVE STATEMENT
29 yo F with hx of asthma  c/o 1 month hx worsening abdominal pain. Pain is described as sharp, throbbing, and pulling sensation and is localized to the RUQ, and lower abdomen b/l RLQ>LLQ. She had an appointment with her OB on July 13 where she was told she had a UTI and was discharged on nitrofurantoin bidx7 days - she finished course of abx yesterday but denies any improvement of sxs. Pt does endorse urgency but denies hematuria, dysuria, or back pain. Denies bleeding but noticed yellowish vaginal discharge since yesterday. Denies previous hx of STDs/STIs. Denies nausea vomiting, fevers or chills. Past surgical history significant for 2 C/S in the past, no appendectomy. Pt takes OCPs. 29 yo F with hx of asthma  c/o 1 month hx worsening abdominal pain. Pain is described as sharp, throbbing, and pulling sensation and is localized to the lower abdomen b/l RLQ>LLQ. She had an appointment with her OB on July 13 where she was told she had a UTI and was discharged on nitrofurantoin bidx7 days - she finished course of abx yesterday but denies any improvement of sxs. Pt does endorse urgency but denies hematuria, dysuria, or back pain. Denies bleeding but noticed yellowish vaginal discharge since yesterday. Denies previous hx of STDs/STIs. Denies nausea vomiting, fevers or chills. Past surgical history significant for 2 C/S in the past, no appendectomy. Pt takes OCPs.

## 2020-08-01 LAB
C TRACH RRNA SPEC QL NAA+PROBE: SIGNIFICANT CHANGE UP
CULTURE RESULTS: SIGNIFICANT CHANGE UP
N GONORRHOEA RRNA SPEC QL NAA+PROBE: SIGNIFICANT CHANGE UP
SPECIMEN SOURCE: SIGNIFICANT CHANGE UP
SPECIMEN SOURCE: SIGNIFICANT CHANGE UP

## 2020-08-18 PROBLEM — J45.909 UNSPECIFIED ASTHMA, UNCOMPLICATED: Chronic | Status: ACTIVE | Noted: 2020-07-31

## 2020-08-20 ENCOUNTER — APPOINTMENT (OUTPATIENT)
Dept: ANTEPARTUM | Facility: CLINIC | Age: 30
End: 2020-08-20

## 2020-08-27 ENCOUNTER — APPOINTMENT (OUTPATIENT)
Dept: ANTEPARTUM | Facility: CLINIC | Age: 30
End: 2020-08-27
Payer: COMMERCIAL

## 2020-08-27 ENCOUNTER — ASOB RESULT (OUTPATIENT)
Age: 30
End: 2020-08-27

## 2020-08-27 PROCEDURE — 76815 OB US LIMITED FETUS(S): CPT

## 2020-08-27 PROCEDURE — 76817 TRANSVAGINAL US OBSTETRIC: CPT

## 2020-08-27 PROCEDURE — 99243 OFF/OP CNSLTJ NEW/EST LOW 30: CPT | Mod: 25

## 2020-09-17 ENCOUNTER — APPOINTMENT (OUTPATIENT)
Dept: ANTEPARTUM | Facility: CLINIC | Age: 30
End: 2020-09-17

## 2020-09-17 ENCOUNTER — ASOB RESULT (OUTPATIENT)
Age: 30
End: 2020-09-17

## 2020-10-02 ENCOUNTER — ASOB RESULT (OUTPATIENT)
Age: 30
End: 2020-10-02

## 2020-10-02 ENCOUNTER — APPOINTMENT (OUTPATIENT)
Dept: ANTEPARTUM | Facility: CLINIC | Age: 30
End: 2020-10-02
Payer: COMMERCIAL

## 2020-10-02 PROCEDURE — 76817 TRANSVAGINAL US OBSTETRIC: CPT

## 2020-10-02 PROCEDURE — 76815 OB US LIMITED FETUS(S): CPT

## 2020-10-13 ENCOUNTER — OUTPATIENT (OUTPATIENT)
Dept: INPATIENT UNIT | Facility: HOSPITAL | Age: 30
LOS: 1 days | Discharge: HOME | End: 2020-10-13

## 2020-10-13 VITALS
SYSTOLIC BLOOD PRESSURE: 118 MMHG | TEMPERATURE: 100 F | DIASTOLIC BLOOD PRESSURE: 76 MMHG | RESPIRATION RATE: 16 BRPM | HEART RATE: 131 BPM

## 2020-10-13 VITALS — SYSTOLIC BLOOD PRESSURE: 109 MMHG | DIASTOLIC BLOOD PRESSURE: 68 MMHG | HEART RATE: 73 BPM

## 2020-10-13 DIAGNOSIS — Z98.891 HISTORY OF UTERINE SCAR FROM PREVIOUS SURGERY: Chronic | ICD-10-CM

## 2020-10-13 DIAGNOSIS — Z3A.00 WEEKS OF GESTATION OF PREGNANCY NOT SPECIFIED: ICD-10-CM

## 2020-10-13 DIAGNOSIS — O26.899 OTHER SPECIFIED PREGNANCY RELATED CONDITIONS, UNSPECIFIED TRIMESTER: ICD-10-CM

## 2020-10-13 LAB
APPEARANCE UR: CLEAR — SIGNIFICANT CHANGE UP
BACTERIA # UR AUTO: SIGNIFICANT CHANGE UP
BILIRUB UR-MCNC: NEGATIVE — SIGNIFICANT CHANGE UP
BLOOD UR QL VISUAL: NEGATIVE — SIGNIFICANT CHANGE UP
COLOR SPEC: SIGNIFICANT CHANGE UP
GLUCOSE UR-MCNC: 1000 — HIGH
HYALINE CASTS # UR AUTO: NEGATIVE — SIGNIFICANT CHANGE UP
KETONES UR-MCNC: NEGATIVE — SIGNIFICANT CHANGE UP
LEUKOCYTE ESTERASE UR-ACNC: SIGNIFICANT CHANGE UP
NITRITE UR-MCNC: NEGATIVE — SIGNIFICANT CHANGE UP
PH UR: 7 — SIGNIFICANT CHANGE UP (ref 5–8)
PROT UR-MCNC: 10 — SIGNIFICANT CHANGE UP
RBC CASTS # UR COMP ASSIST: SIGNIFICANT CHANGE UP (ref 0–?)
SP GR SPEC: 1.01 — SIGNIFICANT CHANGE UP (ref 1–1.04)
SQUAMOUS # UR AUTO: SIGNIFICANT CHANGE UP
UROBILINOGEN FLD QL: NORMAL — SIGNIFICANT CHANGE UP
WBC UR QL: HIGH (ref 0–?)

## 2020-10-13 NOTE — OB PROVIDER TRIAGE NOTE - NSOBPROVIDERNOTE_OBGYN_ALL_OB_FT
This is a 30 year old patient of Dr Sandoval  at 23.4 weeks gestational age    no evidence of ptl, dehydration or UTI  pain likely secondary to history of previous c/s and adhesions  recommended abdominal binder   follow up with atu as scheduled  follow up with dr sandoval in 1-2 weeks  continue fetal kick counts, rest and activity as tolerated, increase PO fluid  follow up as scheduled  ptl precuations reviewed

## 2020-10-13 NOTE — OB PROVIDER TRIAGE NOTE - HISTORY OF PRESENT ILLNESS
This is a 30 year old patient of Dr Weinstein  at 23.4 weeks gestational age presents from [facility] with complaints of cramping, achy lower back and lower abdominal pressure x 2 weeks that has worsened today. states pain scale 7/10, asking for pain management.  reports +GFM, denies LOF, VB or contractions. Pt is seen bimonthly in ATU for history of  deliveries/ classical c section. denies dysuria, hematuria, foul smell, urinary frequency.   AP course:  - bimonthly ATU testing due to hx of classical c/s    med: asthma, albuterol prn, last dose July  sur classical c/s at 29 weeks tiup  2015 low transverse c/s  GYN" denies  OB: 2008 ft uncomplicated  7#12  2009 ptl tiup at 29 weeks classical c/s 2#4/ 2#6 PPH- received 2 units blood transfusion, one baby passed away at 22 months from meningitis     29 weeks ptl received Mag/Beta- 35 wk pprom rpt c/s- 6#12- PPD   current meds: pnv  allergy:   latex: blisters  Gentamyacin- rash  Vancomyacin- rash

## 2020-10-13 NOTE — OB RN TRIAGE NOTE - PSH
H/O:   2009 twins PPROM classical c/s 2#4/2#6  rec'd 2units PRBC  baby passed @ 22 months-menigitis    12/10/2015 rpt c/s 35.3 weeks 6#12   H/O:   2009 twins Ptl classical c/s 2#4/2#6  rec'd 2units PRBC  baby passed @ 22 months-menigitis    12/10/2015 rpt c/s PPROM  35.3 weeks 6#12

## 2020-10-13 NOTE — OB PROVIDER TRIAGE NOTE - NSHPPHYSICALEXAM_GEN_ALL_CORE
Vital Signs Last 24 Hrs  T(C): 36.7 (13 Oct 2020 16:36), Max: 37.6 (13 Oct 2020 15:51)  T(F): 98.06 (13 Oct 2020 16:36), Max: 99.7 (13 Oct 2020 15:51)  HR: 73 (13 Oct 2020 18:16) (73 - 134)  BP: 109/68 (13 Oct 2020 18:16) (106/66 - 118/76)  BP(mean): --  RR: 16 (13 Oct 2020 15:51) (16 - 16)  SpO2: 94% (13 Oct 2020 18:13) (90% - 100%)    A&O x3  CTAB  abdomen: gravid, soft, mild lower abdominal tenderness on palpation  TAS: variable presentation, posterior placenta, mVP 7.52 AGA. +GFM  SSE: cervix appears closed, no VB or LOF  T=TVS CL 3.0-3.6 no funneling or dynamic changes  EFM: 145 baseline mod variability and accels, no decels

## 2020-10-13 NOTE — OB PROVIDER TRIAGE NOTE - ADDITIONAL INSTRUCTIONS
no evidence of ptl, dehydration or UTI  pain likely secondary to history of previous c/s and adhesions  recommended abdominal binder   follow up with atu as scheduled  follow up with dr sandoval in 1-2 weeks  continue fetal kick counts, rest and activity as tolerated, increase PO fluid  follow up as scheduled  ptl precuations reviewed

## 2020-10-13 NOTE — OB PROVIDER TRIAGE NOTE - NS_OBGYNHISTORY_OBGYN_ALL_OB_FT
2008 ft uncomplicated  7#12  2009 ptl tiup at 29 weeks classical c/s 2#4/ 2#6 PPH- received 2 units blood transfusion, one baby passed away at 22 months from meningitis     29 weeks ptl received Mag/Beta- 35 wk pprom rpt c/s- 6#12- PPD

## 2020-10-13 NOTE — OB PROVIDER TRIAGE NOTE - NSHPLABSRESULTS_GEN_ALL_CORE
Urinalysis Basic - ( 13 Oct 2020 16:32 )    Color: LIGHT YELLOW / Appearance: CLEAR / S.014 / pH: 7.0  Gluc: 1000 / Ketone: NEGATIVE  / Bili: NEGATIVE / Urobili: NORMAL   Blood: NEGATIVE / Protein: 10 / Nitrite: NEGATIVE   Leuk Esterase: SMALL / RBC: 0-2 / WBC 6-10   Sq Epi: MODERATE / Non Sq Epi: x / Bacteria: FEW

## 2020-10-13 NOTE — OB PROVIDER TRIAGE NOTE - PSH
H/O:   2009 twins Ptl classical c/s 2#4/2#6  rec'd 2units PRBC  baby passed @ 22 months-menigitis    12/10/2015 rpt c/s PPROM  35.3 weeks 6#12

## 2020-10-15 ENCOUNTER — APPOINTMENT (OUTPATIENT)
Dept: ANTEPARTUM | Facility: CLINIC | Age: 30
End: 2020-10-15
Payer: COMMERCIAL

## 2020-10-15 ENCOUNTER — ASOB RESULT (OUTPATIENT)
Age: 30
End: 2020-10-15

## 2020-10-15 PROCEDURE — 76816 OB US FOLLOW-UP PER FETUS: CPT

## 2020-10-15 PROCEDURE — 76817 TRANSVAGINAL US OBSTETRIC: CPT

## 2020-12-16 ENCOUNTER — INPATIENT (INPATIENT)
Facility: HOSPITAL | Age: 30
LOS: 1 days | Discharge: HOME | End: 2020-12-18
Attending: OBSTETRICS & GYNECOLOGY | Admitting: OBSTETRICS & GYNECOLOGY
Payer: COMMERCIAL

## 2020-12-16 VITALS
SYSTOLIC BLOOD PRESSURE: 117 MMHG | TEMPERATURE: 99 F | RESPIRATION RATE: 20 BRPM | HEART RATE: 107 BPM | DIASTOLIC BLOOD PRESSURE: 72 MMHG

## 2020-12-16 DIAGNOSIS — Z3A.00 WEEKS OF GESTATION OF PREGNANCY NOT SPECIFIED: ICD-10-CM

## 2020-12-16 DIAGNOSIS — O26.899 OTHER SPECIFIED PREGNANCY RELATED CONDITIONS, UNSPECIFIED TRIMESTER: ICD-10-CM

## 2020-12-16 DIAGNOSIS — Z98.891 HISTORY OF UTERINE SCAR FROM PREVIOUS SURGERY: Chronic | ICD-10-CM

## 2020-12-16 LAB
APPEARANCE UR: CLEAR — SIGNIFICANT CHANGE UP
BACTERIA # UR AUTO: ABNORMAL
BILIRUB UR-MCNC: NEGATIVE — SIGNIFICANT CHANGE UP
COLOR SPEC: SIGNIFICANT CHANGE UP
DIFF PNL FLD: NEGATIVE — SIGNIFICANT CHANGE UP
EPI CELLS # UR: 13 /HPF — HIGH (ref 0–5)
GLUCOSE UR QL: ABNORMAL
HYALINE CASTS # UR AUTO: 1 /LPF — SIGNIFICANT CHANGE UP (ref 0–7)
KETONES UR-MCNC: NEGATIVE — SIGNIFICANT CHANGE UP
LEUKOCYTE ESTERASE UR-ACNC: ABNORMAL
NITRITE UR-MCNC: NEGATIVE — SIGNIFICANT CHANGE UP
PH UR: 7 — SIGNIFICANT CHANGE UP (ref 5–8)
PROT UR-MCNC: ABNORMAL
RBC CASTS # UR COMP ASSIST: 1 /HPF — SIGNIFICANT CHANGE UP (ref 0–4)
SP GR SPEC: 1.01 — SIGNIFICANT CHANGE UP (ref 1.01–1.02)
UROBILINOGEN FLD QL: SIGNIFICANT CHANGE UP
WBC UR QL: 11 /HPF — HIGH (ref 0–5)

## 2020-12-16 RX ORDER — SODIUM CHLORIDE 9 MG/ML
1000 INJECTION, SOLUTION INTRAVENOUS ONCE
Refills: 0 | Status: COMPLETED | OUTPATIENT
Start: 2020-12-16 | End: 2020-12-16

## 2020-12-16 RX ORDER — ACETAMINOPHEN 500 MG
975 TABLET ORAL ONCE
Refills: 0 | Status: COMPLETED | OUTPATIENT
Start: 2020-12-16 | End: 2020-12-16

## 2020-12-16 RX ADMIN — SODIUM CHLORIDE 2000 MILLILITER(S): 9 INJECTION, SOLUTION INTRAVENOUS at 20:33

## 2020-12-16 RX ADMIN — Medication 975 MILLIGRAM(S): at 20:32

## 2020-12-16 NOTE — OB PROVIDER TRIAGE NOTE - NS_OBGYNHISTORY_OBGYN_ALL_OB_FT
2008 ft uncomplicated  7#12  2009 ptl tiup at 29 weeks classical c/s 2#4/ 2#6 PPH- received 2 units blood transfusion, one baby passed away at 22 months from meningitis   12/10/2015  29weeks ptl received Mag/Beta- 35 wk rpt c/s- 6#12- PPD

## 2020-12-16 NOTE — OB PROVIDER TRIAGE NOTE - HISTORY OF PRESENT ILLNESS
Pt. is a 29y/o  EGA 32.5 wks reports of constant lower abdominal pain and pressure that started this morning. Pt. denies leakage of fluid, vaginal bleeding, dysuria and hematuria.      Pt. is a 31y/o  EGA 32.5 wks reports of constant lower abdominal pain and pressure that started this morning. Pt. denies leakage of fluid, vaginal bleeding, dysuria and hematuria.       med: asthma, albuterol prn, last dose July  sur classical c/s at 29 weeks tiup   low transverse c/s  GYN" denies  OB:  2008 ft uncomplicated  7#12  2009 ptl tiup at 29 weeks classical c/s 2#4/ 2#6 PPH- received 2 units blood transfusion, one baby passed away at 22 months from meningitis   12/10/2015  29weeks ptl received Mag/Beta- 35 wk rpt c/s- 6#12- PPD   current meds: pnv  allergy:   latex: blisters  Gentamyacin- rash  Vancomyacin- rash

## 2020-12-16 NOTE — OB PROVIDER TRIAGE NOTE - NSHPPHYSICALEXAM_GEN_ALL_CORE
BP: 134/80, HR: 91, Temp: 37.1  Abdomen soft nontender  TAS: Cephalic presentation, anterior placenta, NALDO: 17.44, EFW:1761gm, BPP:8/8  Speculum: Cervix appeared closed  TVS: CL:2.04-2.4 no funneling or dynamic changes  SVE: 1/50/-3 BP: 134/80, HR: 91, Temp: 37.1  Abdomen soft nontender  TAS: Cephalic presentation, anterior placenta, NALDO: 17.44, EFW:1761gm, BPP:8/8  Speculum: Cervix appeared closed  TVS: CL:2.04-2.4 no funneling or dynamic changes  SVE: 1/50/-3  @2230 repeat SVE 1/50/-3

## 2020-12-16 NOTE — OB PROVIDER TRIAGE NOTE - NSHPLABSRESULTS_GEN_ALL_CORE
Urinalysis Basic - ( 16 Dec 2020 19:46 )    Color: Light Yellow / Appearance: Clear / S.014 / pH: x  Gluc: x / Ketone: Negative  / Bili: Negative / Urobili: <2 mg/dL   Blood: x / Protein: Trace / Nitrite: Negative   Leuk Esterase: Moderate / RBC: 1 /HPF / WBC 11 /HPF   Sq Epi: x / Non Sq Epi: 13 /HPF / Bacteria: Few

## 2020-12-16 NOTE — OB PROVIDER TRIAGE NOTE - NSOBPROVIDERNOTE_OBGYN_ALL_OB_FT
Discussed findings with Dr. Lee.  Tylenol 975mg PO hydration encouraged   Repeat VE @945p Discussed findings with Dr. Lee.  Tylenol 975mg PO hydration encouraged   Repeat VE @945p    @10:00pm pt. states relief Discussed findings with Dr. Lee.  Tylenol 975mg PO hydration encouraged   Repeat VE @945p    @10:00pm pt. states relief from tylenol pain is now 4/10  SVE: 1/50/-3 (unchanged)  Discussed findings with Dr. Lee  Plan for FFN    @110am FFN Neg.   Pt. states pain with contractions has increased.  1L RL bolus ordered    @145am Discussed with Dr. Lee  Plan for admission for observation   Urine Culture ordered  1gm Ancef IVPB oredred

## 2020-12-17 ENCOUNTER — TRANSCRIPTION ENCOUNTER (OUTPATIENT)
Age: 30
End: 2020-12-17

## 2020-12-17 ENCOUNTER — APPOINTMENT (OUTPATIENT)
Dept: ANTEPARTUM | Facility: CLINIC | Age: 30
End: 2020-12-17

## 2020-12-17 LAB
ALBUMIN SERPL ELPH-MCNC: 3.1 G/DL — LOW (ref 3.3–5)
ALP SERPL-CCNC: 149 U/L — HIGH (ref 40–120)
ALT FLD-CCNC: 15 U/L — SIGNIFICANT CHANGE UP (ref 4–33)
ANION GAP SERPL CALC-SCNC: 10 MMOL/L — SIGNIFICANT CHANGE UP (ref 7–14)
APTT BLD: 27.3 SEC — SIGNIFICANT CHANGE UP (ref 27–36.3)
AST SERPL-CCNC: 18 U/L — SIGNIFICANT CHANGE UP (ref 4–32)
BASOPHILS # BLD AUTO: 0.01 K/UL — SIGNIFICANT CHANGE UP (ref 0–0.2)
BASOPHILS # BLD AUTO: 0.01 K/UL — SIGNIFICANT CHANGE UP (ref 0–0.2)
BASOPHILS NFR BLD AUTO: 0.1 % — SIGNIFICANT CHANGE UP (ref 0–2)
BASOPHILS NFR BLD AUTO: 0.1 % — SIGNIFICANT CHANGE UP (ref 0–2)
BILIRUB SERPL-MCNC: <0.2 MG/DL — SIGNIFICANT CHANGE UP (ref 0.2–1.2)
BLD GP AB SCN SERPL QL: NEGATIVE — SIGNIFICANT CHANGE UP
BUN SERPL-MCNC: 5 MG/DL — LOW (ref 7–23)
CALCIUM SERPL-MCNC: 8.5 MG/DL — SIGNIFICANT CHANGE UP (ref 8.4–10.5)
CHLORIDE SERPL-SCNC: 106 MMOL/L — SIGNIFICANT CHANGE UP (ref 98–107)
CO2 SERPL-SCNC: 20 MMOL/L — LOW (ref 22–31)
CREAT SERPL-MCNC: 0.55 MG/DL — SIGNIFICANT CHANGE UP (ref 0.5–1.3)
EOSINOPHIL # BLD AUTO: 0.03 K/UL — SIGNIFICANT CHANGE UP (ref 0–0.5)
EOSINOPHIL # BLD AUTO: 0.09 K/UL — SIGNIFICANT CHANGE UP (ref 0–0.5)
EOSINOPHIL NFR BLD AUTO: 0.4 % — SIGNIFICANT CHANGE UP (ref 0–6)
EOSINOPHIL NFR BLD AUTO: 1.2 % — SIGNIFICANT CHANGE UP (ref 0–6)
FIBRINOGEN PPP-MCNC: 656 MG/DL — HIGH (ref 300–520)
FIBRONECTIN FETAL SPEC QL: NEGATIVE — SIGNIFICANT CHANGE UP
GLUCOSE SERPL-MCNC: 99 MG/DL — SIGNIFICANT CHANGE UP (ref 70–99)
HCT VFR BLD CALC: 32.3 % — LOW (ref 34.5–45)
HCT VFR BLD CALC: 34.7 % — SIGNIFICANT CHANGE UP (ref 34.5–45)
HGB BLD-MCNC: 10.3 G/DL — LOW (ref 11.5–15.5)
HGB BLD-MCNC: 10.9 G/DL — LOW (ref 11.5–15.5)
HIV 1+2 AB+HIV1 P24 AG SERPL QL IA: SIGNIFICANT CHANGE UP
IANC: 4.74 K/UL — SIGNIFICANT CHANGE UP (ref 1.5–8.5)
IANC: 6.48 K/UL — SIGNIFICANT CHANGE UP (ref 1.5–8.5)
IMM GRANULOCYTES NFR BLD AUTO: 0.4 % — SIGNIFICANT CHANGE UP (ref 0–1.5)
IMM GRANULOCYTES NFR BLD AUTO: 1 % — SIGNIFICANT CHANGE UP (ref 0–1.5)
INR BLD: 1.11 RATIO — SIGNIFICANT CHANGE UP (ref 0.88–1.17)
LYMPHOCYTES # BLD AUTO: 1.33 K/UL — SIGNIFICANT CHANGE UP (ref 1–3.3)
LYMPHOCYTES # BLD AUTO: 16.1 % — SIGNIFICANT CHANGE UP (ref 13–44)
LYMPHOCYTES # BLD AUTO: 2.23 K/UL — SIGNIFICANT CHANGE UP (ref 1–3.3)
LYMPHOCYTES # BLD AUTO: 28.6 % — SIGNIFICANT CHANGE UP (ref 13–44)
MCHC RBC-ENTMCNC: 26.5 PG — LOW (ref 27–34)
MCHC RBC-ENTMCNC: 26.9 PG — LOW (ref 27–34)
MCHC RBC-ENTMCNC: 31.4 GM/DL — LOW (ref 32–36)
MCHC RBC-ENTMCNC: 31.9 GM/DL — LOW (ref 32–36)
MCV RBC AUTO: 84.2 FL — SIGNIFICANT CHANGE UP (ref 80–100)
MCV RBC AUTO: 84.3 FL — SIGNIFICANT CHANGE UP (ref 80–100)
MONOCYTES # BLD AUTO: 0.35 K/UL — SIGNIFICANT CHANGE UP (ref 0–0.9)
MONOCYTES # BLD AUTO: 0.7 K/UL — SIGNIFICANT CHANGE UP (ref 0–0.9)
MONOCYTES NFR BLD AUTO: 4.2 % — SIGNIFICANT CHANGE UP (ref 2–14)
MONOCYTES NFR BLD AUTO: 9 % — SIGNIFICANT CHANGE UP (ref 2–14)
NEUTROPHILS # BLD AUTO: 4.74 K/UL — SIGNIFICANT CHANGE UP (ref 1.8–7.4)
NEUTROPHILS # BLD AUTO: 6.48 K/UL — SIGNIFICANT CHANGE UP (ref 1.8–7.4)
NEUTROPHILS NFR BLD AUTO: 60.7 % — SIGNIFICANT CHANGE UP (ref 43–77)
NEUTROPHILS NFR BLD AUTO: 78.2 % — HIGH (ref 43–77)
NRBC # BLD: 0 /100 WBCS — SIGNIFICANT CHANGE UP
NRBC # BLD: 0 /100 WBCS — SIGNIFICANT CHANGE UP
NRBC # FLD: 0 K/UL — SIGNIFICANT CHANGE UP
NRBC # FLD: 0 K/UL — SIGNIFICANT CHANGE UP
PLATELET # BLD AUTO: 238 K/UL — SIGNIFICANT CHANGE UP (ref 150–400)
PLATELET # BLD AUTO: 263 K/UL — SIGNIFICANT CHANGE UP (ref 150–400)
POTASSIUM SERPL-MCNC: 3.9 MMOL/L — SIGNIFICANT CHANGE UP (ref 3.5–5.3)
POTASSIUM SERPL-SCNC: 3.9 MMOL/L — SIGNIFICANT CHANGE UP (ref 3.5–5.3)
PROT SERPL-MCNC: 6.4 G/DL — SIGNIFICANT CHANGE UP (ref 6–8.3)
PROTHROM AB SERPL-ACNC: 12.6 SEC — SIGNIFICANT CHANGE UP (ref 9.8–13.1)
RBC # BLD: 3.83 M/UL — SIGNIFICANT CHANGE UP (ref 3.8–5.2)
RBC # BLD: 4.12 M/UL — SIGNIFICANT CHANGE UP (ref 3.8–5.2)
RBC # FLD: 13.8 % — SIGNIFICANT CHANGE UP (ref 10.3–14.5)
RBC # FLD: 14 % — SIGNIFICANT CHANGE UP (ref 10.3–14.5)
RH IG SCN BLD-IMP: POSITIVE — SIGNIFICANT CHANGE UP
SARS-COV-2 RNA SPEC QL NAA+PROBE: SIGNIFICANT CHANGE UP
SODIUM SERPL-SCNC: 136 MMOL/L — SIGNIFICANT CHANGE UP (ref 135–145)
T PALLIDUM AB TITR SER: NEGATIVE — SIGNIFICANT CHANGE UP
T4 FREE SERPL-MCNC: 1.2 NG/DL — SIGNIFICANT CHANGE UP (ref 0.9–1.8)
TSH SERPL-MCNC: 2.07 UIU/ML — SIGNIFICANT CHANGE UP (ref 0.27–4.2)
WBC # BLD: 7.8 K/UL — SIGNIFICANT CHANGE UP (ref 3.8–10.5)
WBC # BLD: 8.28 K/UL — SIGNIFICANT CHANGE UP (ref 3.8–10.5)
WBC # FLD AUTO: 7.8 K/UL — SIGNIFICANT CHANGE UP (ref 3.8–10.5)
WBC # FLD AUTO: 8.28 K/UL — SIGNIFICANT CHANGE UP (ref 3.8–10.5)

## 2020-12-17 PROCEDURE — 93010 ELECTROCARDIOGRAM REPORT: CPT

## 2020-12-17 PROCEDURE — 99222 1ST HOSP IP/OBS MODERATE 55: CPT

## 2020-12-17 RX ORDER — SODIUM CHLORIDE 9 MG/ML
1000 INJECTION, SOLUTION INTRAVENOUS
Refills: 0 | Status: DISCONTINUED | OUTPATIENT
Start: 2020-12-17 | End: 2020-12-17

## 2020-12-17 RX ORDER — NIFEDIPINE 30 MG
10 TABLET, EXTENDED RELEASE 24 HR ORAL EVERY 6 HOURS
Refills: 0 | Status: DISCONTINUED | OUTPATIENT
Start: 2020-12-17 | End: 2020-12-17

## 2020-12-17 RX ORDER — AMPICILLIN TRIHYDRATE 250 MG
2 CAPSULE ORAL ONCE
Refills: 0 | Status: COMPLETED | OUTPATIENT
Start: 2020-12-17 | End: 2020-12-17

## 2020-12-17 RX ORDER — AMPICILLIN TRIHYDRATE 250 MG
CAPSULE ORAL
Refills: 0 | Status: DISCONTINUED | OUTPATIENT
Start: 2020-12-17 | End: 2020-12-17

## 2020-12-17 RX ORDER — CEFAZOLIN SODIUM 1 G
1000 VIAL (EA) INJECTION ONCE
Refills: 0 | Status: DISCONTINUED | OUTPATIENT
Start: 2020-12-17 | End: 2020-12-17

## 2020-12-17 RX ORDER — ALBUTEROL 90 UG/1
2 AEROSOL, METERED ORAL EVERY 6 HOURS
Refills: 0 | Status: DISCONTINUED | OUTPATIENT
Start: 2020-12-17 | End: 2020-12-18

## 2020-12-17 RX ORDER — OXYTOCIN 10 UNIT/ML
333.33 VIAL (ML) INJECTION
Qty: 20 | Refills: 0 | Status: DISCONTINUED | OUTPATIENT
Start: 2020-12-17 | End: 2020-12-18

## 2020-12-17 RX ORDER — AMPICILLIN TRIHYDRATE 250 MG
1 CAPSULE ORAL EVERY 4 HOURS
Refills: 0 | Status: DISCONTINUED | OUTPATIENT
Start: 2020-12-17 | End: 2020-12-17

## 2020-12-17 RX ORDER — NIFEDIPINE 30 MG
10 TABLET, EXTENDED RELEASE 24 HR ORAL
Refills: 0 | Status: COMPLETED | OUTPATIENT
Start: 2020-12-17 | End: 2020-12-17

## 2020-12-17 RX ADMIN — Medication 975 MILLIGRAM(S): at 07:42

## 2020-12-17 RX ADMIN — Medication 208 GRAM(S): at 06:50

## 2020-12-17 RX ADMIN — Medication 10 MILLIGRAM(S): at 03:47

## 2020-12-17 RX ADMIN — Medication 12 MILLIGRAM(S): at 03:05

## 2020-12-17 RX ADMIN — Medication 10 MILLIGRAM(S): at 03:05

## 2020-12-17 RX ADMIN — Medication 10 MILLIGRAM(S): at 03:27

## 2020-12-17 RX ADMIN — Medication 216 GRAM(S): at 03:05

## 2020-12-17 NOTE — DISCHARGE NOTE ANTEPARTUM - CARE PROVIDER_API CALL
Kohanim, Behnam  OBSTETRICS AND GYNECOLOGY  [address], [address]  [place] [address]  Phone: [phone]  Fax: [phone]  Follow Up Time:

## 2020-12-17 NOTE — DISCHARGE NOTE ANTEPARTUM - PATIENT PORTAL LINK FT
You can access the FollowMyHealth Patient Portal offered by [facility] by registering at the following website: [email or web address]. By joining [employer]’s FollowMyHealth portal, you will also be able to view your health information using other applications (apps) compatible with our system.

## 2020-12-17 NOTE — OB PROVIDER H&P - ASSESSMENT
Discussed findings with Dr. Lee.  Tylenol 975mg PO hydration encouraged   Repeat VE @945p    @10:00pm pt. states relief from tylenol pain is now 4/10  SVE: 1/50/-3 (unchanged)  Discussed findings with Dr. Lee  Plan for FFN    @110am FFN Neg.   Pt. states pain with contractions has increased.  1L RL bolus ordered    @145am Discussed with Dr. Lee  Plan for admission for observation   Urine Culture ordered  1gm Ancef IVPB ordered

## 2020-12-17 NOTE — DISCHARGE NOTE ANTEPARTUM - MEDICATION SUMMARY - MEDICATIONS TO TAKE
I will START or STAY ON the medications listed below when I get home from the hospital:    albuterol  -- inhaled prn  -- Indication: For asthma    Prenatal 1  -- Indication: For Pregnancy

## 2020-12-17 NOTE — DISCHARGE NOTE ANTEPARTUM - PLAN OF CARE
Continue prenatal course - Follow up with OB within one week  - Return with contractions, vaginal bleeding, leaking fluid or decreased fetal movement

## 2020-12-17 NOTE — PROGRESS NOTE ADULT - ASSESSMENT
Plan  - Stat Labs w/ TSH  - EKG    Incomplete note  J José Manuel PGY3 31 yo  @ 32w6d presents with abdominal pain with previous history of classical  section now w/ resolved abdominal pain     #PTL  - Procardia for tocolysis  - BMZ for FLM  - f/u GBS   - Initial prolonged monitoring, may switch to NST BID after      #Fetal wellbeing  - BMZ/Monitoring as above  - NICU consult  - f/u GBS    #Maternal wellbeing  - Regular diet  - HSQ/SCDs for DVT ppx  - PNV/Iron/Folic acid      #Tachycardia  -possibly secondary to Procardia  - Stat Labs w/ TSH  - EKG      CARLA Georges PGY3

## 2020-12-17 NOTE — OB PROVIDER H&P - HISTORY OF PRESENT ILLNESS
Pt. is a 29y/o  EGA 32.5 wks reports of constant lower abdominal pain and pressure that started this morning. Pt. denies leakage of fluid, vaginal bleeding, dysuria and hematuria.       med: asthma, albuterol prn, last dose July  sur classical c/s at 29 weeks tiup   low transverse c/s  GYN" denies  OB:  2008 ft uncomplicated  7#12  2009 ptl tiup at 29 weeks classical c/s 2#4/ 2#6 PPH- received 2 units blood transfusion, one baby passed away at 22 months from meningitis   12/10/2015  29weeks ptl received Mag/Beta- 35 wk rpt c/s- 6#12- PPD   current meds: pnv  allergy:   latex: blisters  Gentamyacin- rash  Vancomyacin- rash

## 2020-12-17 NOTE — OB PROVIDER H&P - NSSCHADMISSION_OBGYN_A_OB
Procedure:  XR CHEST 2 VIEWS        



Exam Date:  4/29/2018 9:51 AM CDT



Ordering Provider:  Virgilio Jennings



Clinical Indication:  pain



Comparison: None 



Findings: 



The lungs are clear and well-aerated. No pleural effusion or

pneumothorax. Cardiac silhouette is normal in size.





Impression: 



No acute pulmonary process.



Electronically signed by:  Mele Naidu MD  4/29/2018 10:32 AM

CDT Workstation: [id number] No

## 2020-12-17 NOTE — CHART NOTE - NSCHARTNOTEFT_GEN_A_CORE
R4 Antepartum Chart Note    31 yo  at 32w5d with h/o a previous 29w classical c/s for TIUP and rLTCS for labor at 35w, presents with abdominal pain and pelvic pressure. Pt reports that she had RLQ pain that comes and goes and states she "feels like she's in labor." Pt denies fever, chills, N/V. Denies LOF, VB.    med: asthma, albuterol prn, last dose July  sur classical c/s at 29 weeks tiup   low transverse c/s  GYN" denies  OB:  2008 ft uncomplicated  7#12  2009 ptl tiup at 29 weeks classical c/s 2#4/ 2#6 PPH- received 2 units blood transfusion, one baby passed away at 22 months from meningitis   12/10/2015  29weeks ptl received Mag/Beta- 35 wk rpt c/s- 6#12- PPD   current meds: pnv  allergy:   latex: blisters  Gentamicin- rash  Vancomicin- rash    Vital Signs Last 24 Hrs  T(C): 37 (16 Dec 2020 18:41), Max: 37.0 (16 Dec 2020 18:41)  T(F): 98.6 (16 Dec 2020 18:41), Max: 98.6 (16 Dec 2020 18:41)  HR: 85 (17 Dec 2020 01:24) (85 - 108)  BP: 108/69 (17 Dec 2020 01:24) (99/60 - 128/59)  BP(mean): --  RR: 20 (16 Dec 2020 18:41) (20 - 20)  SpO2: --    Physical exam:  gen: AAOx3  CV: RRR  pulm: CTA b/l  Abd: gravid, nonspecific tenderness throughout abdomen. RLQ tenderness>LLQ  Ext: no edema, Agnes's   SVE: 1/5/-3, soft, posterior, unchanged from ACP's exam  TAS (by ACP): Cephalic presentation, anterior placenta, NALDO: 17.44, EFW:1761gm, BPP:8/8  Speculum: Cervix appeared closed  TVS (by ACP): CL: 2.04-2.4 no funneling or dynamic changes  FFN negative    LABS:  Admission labs pending      Urinalysis Basic - ( 16 Dec 2020 19:46 )    Color: Light Yellow / Appearance: Clear / S.014 / pH: x  Gluc: x / Ketone: Negative  / Bili: Negative / Urobili: <2 mg/dL   Blood: x / Protein: Trace / Nitrite: Negative   Leuk Esterase: Moderate / RBC: 1 /HPF / WBC 11 /HPF   Sq Epi: x / Non Sq Epi: 13 /HPF / Bacteria: Few      31 yo  at 32w5d with h/o a previous 29w classical c/s for TIUP and rLTCS for labor at 35w, admitted to r/o PTL. Ddx: PTL vs.  ctx vs. uterine rupture vs. chorio vs. pains of pregnancy.  -plan for BMZ for fetal lung maturity  -plan for ampicillin for GBS unknown in the setting of possible PTL  -Procardia for tocolysis  -NPO for possible repeat c/s if ctx pain worsens or pt in labor  -Collect GBS and COVID  -Cont EFM/TOCO  -LR@125cc/hr  -For CHRIST parker in AM    D/w Dr. Rosa Saavedra PGY4 R4 Antepartum Chart Note    29 yo  at 32w5d with h/o a previous 29w classical c/s for TIUP and rLTCS for labor at 35w, presents with abdominal pain and pelvic pressure. Pt reports that she had RLQ pain that comes and goes and states she "feels like she's in labor." Pt denies fever, chills, N/V. Denies LOF, VB.    med: asthma, albuterol prn, last dose July  sur classical c/s at 29 weeks tiup   low transverse c/s  GYN" denies  OB:  2008 ft uncomplicated  7#12  2009 ptl tiup at 29 weeks classical c/s 2#4/ 2#6 PPH- received 2 units blood transfusion, one baby passed away at 22 months from meningitis   12/10/2015  29weeks ptl received Mag/Beta- 35 wk rpt c/s- 6#12- PPD   current meds: pnv  allergy:   latex: blisters  Gentamicin- rash  Vancomicin- rash    Vital Signs Last 24 Hrs  T(C): 37 (16 Dec 2020 18:41), Max: 37.0 (16 Dec 2020 18:41)  T(F): 98.6 (16 Dec 2020 18:41), Max: 98.6 (16 Dec 2020 18:41)  HR: 85 (17 Dec 2020 01:24) (85 - 108)  BP: 108/69 (17 Dec 2020 01:24) (99/60 - 128/59)  BP(mean): --  RR: 20 (16 Dec 2020 18:41) (20 - 20)  SpO2: --    Physical exam:  gen: AAOx3  CV: RRR  pulm: CTA b/l  Abd: gravid, nonspecific tenderness throughout abdomen. RLQ tenderness>LLQ, No CVA tenderness  Ext: no edema, Agnes's   SVE: 1/5/-3, soft, posterior, unchanged from ACP's exam  TAS (by ACP): Cephalic presentation, anterior placenta, NALDO: 17.44, EFW:1761gm, BPP:8/8  Speculum: Cervix appeared closed  TVS (by ACP): CL: 2.04-2.4 no funneling or dynamic changes  FFN negative    LABS:  Admission labs pending      Urinalysis Basic - ( 16 Dec 2020 19:46 )    Color: Light Yellow / Appearance: Clear / S.014 / pH: x  Gluc: x / Ketone: Negative  / Bili: Negative / Urobili: <2 mg/dL   Blood: x / Protein: Trace / Nitrite: Negative   Leuk Esterase: Moderate / RBC: 1 /HPF / WBC 11 /HPF   Sq Epi: x / Non Sq Epi: 13 /HPF / Bacteria: Few      29 yo  at 32w5d with h/o a previous 29w classical c/s for TIUP and rLTCS for labor at 35w, admitted to r/o PTL. Ddx: PTL vs.  ctx vs. uterine rupture vs. chorio vs. pains of pregnancy.  -plan for BMZ for fetal lung maturity  -plan for ampicillin for GBS unknown in the setting of possible PTL  -Procardia for tocolysis  -NPO for possible repeat c/s if ctx pain worsens or pt in labor  -Collect GBS and COVID  -Cont EFM/TOCO  -LR@125cc/hr  -For CHRIST parker in AM    D/w Dr. Rosa Saavedra PGY4

## 2020-12-17 NOTE — OB RN PATIENT PROFILE - NS_PREOPBLOODCONS_OBGYN_ALL_OB
Called patient with results of echo yesterday. Patient's EF remains at 70%, aortic valve is functioning normally with no rocking motion. Mean gradient is 14mmHg, trivial regurgitation.   Overall, there has been no significant interval changes compared to pr
No

## 2020-12-17 NOTE — CONSULT NOTE PEDS - SUBJECTIVE AND OBJECTIVE BOX
I met with Ms. Garrett and spouse on L&D and discussed what to expect should she deliver at 32 weeks gestation. Maternal history significant for 2 prior  deliveries: 29 week twins born at [facility] and transferred here to [facility], one twin  at 15 months due to complications of meningitis. Two other children at home 11 year old male healthy and 5 year old male healthy. Things we reviewed today:    1. The NICU team will be present at her delivery and will immediately assess and care for her infant.    2. Overall survival at 32 weeks gestation is 98%.    3. The infant will likely require respiratory support, either in the form of nasal CPAP or intubation and mechanical ventilation.    4. The infant will be at risk for jaundice which can be treated with phototherapy.    5. Depending on the clinical status of the infant, enteral feedings may not be started immediately. The infant will receive IVF/IV nutrition as necessary. The infant is also at risk for hypoglycemia. Due to immature suck/swallow, the infant may require an orogastric tube once feeds are initiated.    6. The infant will be screened for infection and treated with antibiotics at birth. If the infant's clinical status changes during his NICU course, he will again be screened and treated for infection.    7. The infant is at risk for thermoregulation issues.    8. All premature infants are at risk for developmental delays and will be monitored for the first two years of life by developmental pediatricians.    9. Average length of stay is about 3 weeks.    Ms. Garrett and her spouse had the opportunity to ask questions and may contact the NICU at any time if further question arise. This is the spouse's first child. Mother had questions on when baby would be discharged home. We reviewed the requirements (milestones - eating, breathing, growing, maintaining temp) for baby to be safely discharged home. Mother understood and had no further questions    Thank you for the opportunity to participate in the care of this patient and please inform us of any changes in her status.

## 2020-12-17 NOTE — OB PROVIDER H&P - NSHPPHYSICALEXAM_GEN_ALL_CORE
BP: 134/80, HR: 91, Temp: 37.1  Abdomen soft nontender  TAS: Cephalic presentation, anterior placenta, NALDO: 17.44, EFW:1761gm, BPP:8/8  Speculum: Cervix appeared closed  TVS: CL:2.04-2.4 no funneling or dynamic changes  SVE: 1/50/-3  @2230 repeat SVE 1/50/-3

## 2020-12-17 NOTE — DISCHARGE NOTE ANTEPARTUM - HOSPITAL COURSE
31 yo  at3 2w6d presents with abdominal pain with previous history of classical  section now w/ resolved abdominal pain  s/p  Procardia for tocolysis and BMZ for FLM 31 yo  at3 2w6d presents with abdominal pain (on admission VE /-3) with previous history of classical  section now w/ resolved abdominal pain s/p  Procardia for tocolysis and BMZ for FLM from -. Patient now denies any abdominal pain, contractions, vaginal bleeding, leaking fluid. Endorses good fetal movement. Pt with unchanged cervix on  of /-3. ATU sono : cephalic presentation/ Anterior placneta/ NALDO 15.7/ BPP  EFW 2061grams. Pt stable for discharge home with strict PTL precautions and follow up early next week in office.

## 2020-12-17 NOTE — DISCHARGE NOTE ANTEPARTUM - CARE PLAN
Principal Discharge DX:	 contractions  Goal:	Continue prenatal course  Assessment and plan of treatment:	- Follow up with OB within one week  - Return with contractions, vaginal bleeding, leaking fluid or decreased fetal movement

## 2020-12-18 ENCOUNTER — ASOB RESULT (OUTPATIENT)
Age: 30
End: 2020-12-18

## 2020-12-18 ENCOUNTER — APPOINTMENT (OUTPATIENT)
Dept: ANTEPARTUM | Facility: HOSPITAL | Age: 30
End: 2020-12-18
Payer: COMMERCIAL

## 2020-12-18 VITALS
SYSTOLIC BLOOD PRESSURE: 115 MMHG | TEMPERATURE: 98 F | RESPIRATION RATE: 18 BRPM | HEART RATE: 110 BPM | OXYGEN SATURATION: 98 % | DIASTOLIC BLOOD PRESSURE: 64 MMHG

## 2020-12-18 DIAGNOSIS — O47.9 FALSE LABOR, UNSPECIFIED: ICD-10-CM

## 2020-12-18 LAB
BLD GP AB SCN SERPL QL: NEGATIVE — SIGNIFICANT CHANGE UP
CULTURE RESULTS: SIGNIFICANT CHANGE UP
CULTURE RESULTS: SIGNIFICANT CHANGE UP
RH IG SCN BLD-IMP: POSITIVE — SIGNIFICANT CHANGE UP
SARS-COV-2 IGG SERPL QL IA: NEGATIVE — SIGNIFICANT CHANGE UP
SARS-COV-2 IGM SERPL IA-ACNC: 0.64 INDEX — SIGNIFICANT CHANGE UP
SPECIMEN SOURCE: SIGNIFICANT CHANGE UP
SPECIMEN SOURCE: SIGNIFICANT CHANGE UP

## 2020-12-18 PROCEDURE — 76819 FETAL BIOPHYS PROFIL W/O NST: CPT | Mod: 26

## 2020-12-18 PROCEDURE — 76816 OB US FOLLOW-UP PER FETUS: CPT | Mod: 26

## 2020-12-18 RX ADMIN — Medication 12 MILLIGRAM(S): at 03:18

## 2020-12-18 NOTE — CHART NOTE - NSCHARTNOTEFT_GEN_A_CORE
Pt seen and examined for cervical changes. Denies contx, vaginal bleeding, leaking fluid. Denies any abdominal pain or vaginal pressures. Endorses good fetal movement.     PE:  Vital Signs Last 24 Hrs  T(C): 36.9 (18 Dec 2020 09:42), Max: 37.3 (17 Dec 2020 17:51)  T(F): 98.5 (18 Dec 2020 09:42), Max: 99.1 (17 Dec 2020 17:51)  HR: 109 (18 Dec 2020 09:42) (84 - 117)  BP: 115/64 (18 Dec 2020 09:42) (103/50 - 116/70)  BP(mean): --  RR: 18 (18 Dec 2020 09:42) (17 - 18)  SpO2: 98% (18 Dec 2020 09:42) (96% - 100%)  Abd: gravid soft NT  EFM: 135 moderate variability + acels no decels  [place]: None  VE:1/50/-3  ATU BPP : 8/8    Plan:  -discharge home with strict PTL precautions, [facility]KC instructions and close outpatient follow up on Mon/Tues    luan POLO on rounds and Dr. Shaila [facility]

## 2020-12-18 NOTE — PROGRESS NOTE ADULT - ASSESSMENT
31 yo  @ 33w0d admitted with abdominal pain with previous history of classical  section now resolved. Patient remains hemodynamically stable with no acute complaints at this time and low suspicion for PTL.     #r/o PTL  - s/p Procardia for tocolysis ()   - s/p BMZ (-) for FLM  - f/u GBS ()     #Tachycardia  - likely rebound tachycardia secondary to Procardia, now resolved   - TSH 2.07 wnl   - EKG sinus tach ()     #Fetal wellbeing  - s/p BMZ (-) for FLM  - f/u GBS ()   - Appreciate NICU consult    - NST BID    #Maternal wellbeing  - Regular diet  - HSQ/SCDs for DVT ppx  - PNV/Iron/Folic acid

## 2020-12-18 NOTE — PROGRESS NOTE ADULT - SUBJECTIVE AND OBJECTIVE BOX
Attending Antepartum Note    She is a  30y woman     32w6d  hospitalized with abdominal pain and  contractions  hx of    Vaginal delivery     Asthma    Urinary tract infection    H/O: : classical for twins, followed by LT in     Subjective:  The patient feels better, denies abdominal pain or palpitations, feels fetal movement   She reports: denies n/v, changes in vision, h/a or RUQ pain, no bleeding, no LOF    Physical exam:  She generally looks and feels well  Other:    Vital Signs Last 24 Hrs  T(C): 36.7 (17 Dec 2020 06:45), Max: 37.0 (16 Dec 2020 18:41)  T(F): 98.06 (17 Dec 2020 06:45), Max: 98.6 (16 Dec 2020 18:41)  HR: 99 (17 Dec 2020 09:13) (75 - 130)  BP: 91/55 (17 Dec 2020 08:22) (90/51 - 128/59)  RR: 15 (17 Dec 2020 02:48) (15 - 20)  SpO2: 94% (17 Dec 2020 09:13) (92% - 100%)  Gen: NAD, not in pain  Abdomen: soft, NT  Ext: No DVT signs, warm extremities,   VE: deferred  Neuro: A+Ox3      Allergies    gentamicin (Rash)  latex (Blisters; Rash; Swelling)  Vancomycin Hydrochloride (Rash)    Intolerances      MEDICATIONS  (STANDING):  betamethasone Injectable 12 milliGRAM(s) IntraMuscular every 24 hours  oxytocin Infusion 333.333 milliUNIT(s)/Min (1000 mL/Hr) IV Continuous <Continuous>    MEDICATIONS  (PRN):  ALBUTerol    90 MICROgram(s) HFA Inhaler 2 Puff(s) Inhalation every 6 hours PRN Shortness of Breath and/or Wheezing      LABS:                        10.3   8.28  )-----------( 238      ( 17 Dec 2020 06:21 )             32.3                         10.9   7.80  )-----------( 263      ( 17 Dec 2020 03:20 )             34.7     17 Dec 2020 06:21    136    |  106    |  5<L>   ----------------------------<  99     3.9     |  20<L>  |  0.55     Ca    8.5        17 Dec 2020 06:21    TPro  6.4    /  Alb  3.1<L>  /  TBili  <0.2   /  DBili  x      /  AST  18     /  ALT  15     /  AlkPhos  149<H>  17 Dec 2020 06:21    PT/INR - ( 17 Dec 2020 06:21 )   PT: 12.6 sec;   INR: 1.11 ratio         PTT - ( 17 Dec 2020 06:21 )  PTT:27.3 sec  Urinalysis Basic - ( 16 Dec 2020 19:46 )    Color: Light Yellow / Appearance: Clear / S.014 / pH: x  Gluc: x / Ketone: Negative  / Bili: Negative / Urobili: <2 mg/dL   Blood: x / Protein: Trace / Nitrite: Negative   Leuk Esterase: Moderate / RBC: 1 /HPF / WBC 11 /HPF   Sq Epi: x / Non Sq Epi: 13 /HPF / Bacteria: Few        A/P: 31 yo female, admitted for  labor  , HD 2  , presently at   32 6/7  weeks of gestation. Stable, afebrile., pain subsided    s/p Betamethasone x1 for FLM  s/p procardia for PTL  scheduled for [facility] US today for fetal growth   continue FHT and toco, monitor for s/s of PTL  labs reviewed, urine cx sent    NICU consult  Will consider transfer to antepartum floor if stable  Patient will need CD for delivery if in  labor and delivery is anticipated  Patient agrees with the plan  MD gilmer          
R3 Antepartum Note, HD#1    Patient seen and examined at bedside. Patient states she feels chills and cold and is worried about her elevated heart rate. She states her abdominal pain has reoslved. Pt reports +FM, denies LOF, VB, ctx, HA, epigastric pain, blurred vision, CP, SOB, N/V.    Vital Signs Last 24 Hours  T(C): 36.3 (12-17-20 @ 05:33), Max: 37.0 (12-16-20 @ 18:41)  HR: 107 (12-17-20 @ 05:37) (75 - 130)  BP: 110/65 (12-17-20 @ 05:37) (90/51 - 128/59)  RR: 15 (12-17-20 @ 02:48) (15 - 20)  SpO2: 97% (12-17-20 @ 05:36) (97% - 100%)    CAPILLARY BLOOD GLUCOSE          Physical Exam:  General: NAD  CV: manual pulse 116  Abdomen: Soft, non-tender, gravid  Ext: No pain or swelling    NST reactive overnight    Labs:             10.9   7.80  )-----------( 263      ( 12-17 @ 03:20 )             34.7                   MEDICATIONS  (STANDING):  ampicillin  IVPB      ampicillin  IVPB 1 Gram(s) IV Intermittent every 4 hours  betamethasone Injectable 12 milliGRAM(s) IntraMuscular every 24 hours  lactated ringers. 1000 milliLiter(s) (125 mL/Hr) IV Continuous <Continuous>  NIFEdipine IR 10 milliGRAM(s) Oral every 6 hours  oxytocin Infusion 333.333 milliUNIT(s)/Min (1000 mL/Hr) IV Continuous <Continuous>    MEDICATIONS  (PRN):  ALBUTerol    90 MICROgram(s) HFA Inhaler 2 Puff(s) Inhalation every 6 hours PRN Shortness of Breath and/or Wheezing  
R3 Antepartum Note, HD#2    Patient seen and examined at bedside. Patient reports resolved abdominal pain. Denies further ctx. Denies LOF, VB and endorses good fetal movement. Denies HA, changes in vision, epigastric pain, RUQ pain, N/V, CP, SOB, lightheadedness, dizziness.     Vital Signs Last 24 Hrs  T(C): 36.7 (18 Dec 2020 05:26), Max: 37.3 (17 Dec 2020 17:51)  T(F): 98.1 (18 Dec 2020 05:26), Max: 99.1 (17 Dec 2020 17:51)  HR: 93 (18 Dec 2020 05:26) (84 - 123)  BP: 110/58 (18 Dec 2020 05:26) (91/55 - 116/70)  BP(mean): --  RR: 17 (18 Dec 2020 05:26) (17 - 17)  SpO2: 98% (18 Dec 2020 05:26) (92% - 100%)                          10.3   8.28  )-----------( 238      ( 17 Dec 2020 06:21 )             32.3       12-    136  |  106  |  5<L>  ----------------------------<  99  3.9   |  20<L>  |  0.55    Ca    8.5      17 Dec 2020 06:21    TPro  6.4  /  Alb  3.1<L>  /  TBili  <0.2  /  DBili  x   /  AST  18  /  ALT  15  /  AlkPhos  149<H>  12-17    LIVER FUNCTIONS - ( 17 Dec 2020 06:21 )  Alb: 3.1 g/dL / Pro: 6.4 g/dL / ALK PHOS: 149 U/L / ALT: 15 U/L / AST: 18 U/L / GGT: x           PT/INR - ( 17 Dec 2020 06:21 )   PT: 12.6 sec;   INR: 1.11 ratio     PTT - ( 17 Dec 2020 06:21 )  PTT:27.3 sec    Urinalysis Basic - ( 16 Dec 2020 19:46 )    Color: Light Yellow / Appearance: Clear / S.014 / pH: x  Gluc: x / Ketone: Negative  / Bili: Negative / Urobili: <2 mg/dL   Blood: x / Protein: Trace / Nitrite: Negative   Leuk Esterase: Moderate / RBC: 1 /HPF / WBC 11 /HPF   Sq Epi: x / Non Sq Epi: 13 /HPF / Bacteria: Few      Physical Exam:  General: NAD, AOx3   Abdomen: Soft, gravid, non-tender, non-distended, no rebound or guarding   Ext: No erythema/tenderness/edema in b/l LE       MEDICATIONS  (STANDING):  oxytocin Infusion 333.333 milliUNIT(s)/Min (1000 mL/Hr) IV Continuous <Continuous>    MEDICATIONS  (PRN):  ALBUTerol    90 MICROgram(s) HFA Inhaler 2 Puff(s) Inhalation every 6 hours PRN Shortness of Breath and/or Wheezing

## 2020-12-31 ENCOUNTER — TRANSCRIPTION ENCOUNTER (OUTPATIENT)
Age: 30
End: 2020-12-31

## 2021-01-01 ENCOUNTER — RESULT REVIEW (OUTPATIENT)
Age: 31
End: 2021-01-01

## 2021-01-01 ENCOUNTER — INPATIENT (INPATIENT)
Facility: HOSPITAL | Age: 31
LOS: 1 days | Discharge: HOME | End: 2021-01-03
Attending: OBSTETRICS & GYNECOLOGY | Admitting: OBSTETRICS & GYNECOLOGY
Payer: COMMERCIAL

## 2021-01-01 VITALS
DIASTOLIC BLOOD PRESSURE: 76 MMHG | TEMPERATURE: 99 F | RESPIRATION RATE: 16 BRPM | SYSTOLIC BLOOD PRESSURE: 109 MMHG | HEART RATE: 106 BPM

## 2021-01-01 DIAGNOSIS — Z98.891 HISTORY OF UTERINE SCAR FROM PREVIOUS SURGERY: ICD-10-CM

## 2021-01-01 DIAGNOSIS — O60.03 PRETERM LABOR WITHOUT DELIVERY, THIRD TRIMESTER: ICD-10-CM

## 2021-01-01 DIAGNOSIS — Z98.891 HISTORY OF UTERINE SCAR FROM PREVIOUS SURGERY: Chronic | ICD-10-CM

## 2021-01-01 DIAGNOSIS — O26.899 OTHER SPECIFIED PREGNANCY RELATED CONDITIONS, UNSPECIFIED TRIMESTER: ICD-10-CM

## 2021-01-01 DIAGNOSIS — Z3A.00 WEEKS OF GESTATION OF PREGNANCY NOT SPECIFIED: ICD-10-CM

## 2021-01-01 DIAGNOSIS — Z01.818 ENCOUNTER FOR OTHER PREPROCEDURAL EXAMINATION: Chronic | ICD-10-CM

## 2021-01-01 LAB
BASOPHILS # BLD AUTO: 0.01 K/UL — SIGNIFICANT CHANGE UP (ref 0–0.2)
BASOPHILS NFR BLD AUTO: 0.1 % — SIGNIFICANT CHANGE UP (ref 0–2)
BLD GP AB SCN SERPL QL: NEGATIVE — SIGNIFICANT CHANGE UP
EOSINOPHIL # BLD AUTO: 0.02 K/UL — SIGNIFICANT CHANGE UP (ref 0–0.5)
EOSINOPHIL NFR BLD AUTO: 0.2 % — SIGNIFICANT CHANGE UP (ref 0–6)
HCT VFR BLD CALC: 42.1 % — SIGNIFICANT CHANGE UP (ref 34.5–45)
HGB BLD-MCNC: 13.2 G/DL — SIGNIFICANT CHANGE UP (ref 11.5–15.5)
IANC: 5.29 K/UL — SIGNIFICANT CHANGE UP (ref 1.5–8.5)
IMM GRANULOCYTES NFR BLD AUTO: 0.6 % — SIGNIFICANT CHANGE UP (ref 0–1.5)
LYMPHOCYTES # BLD AUTO: 2.34 K/UL — SIGNIFICANT CHANGE UP (ref 1–3.3)
LYMPHOCYTES # BLD AUTO: 27.4 % — SIGNIFICANT CHANGE UP (ref 13–44)
MCHC RBC-ENTMCNC: 26.6 PG — LOW (ref 27–34)
MCHC RBC-ENTMCNC: 31.4 GM/DL — LOW (ref 32–36)
MCV RBC AUTO: 84.7 FL — SIGNIFICANT CHANGE UP (ref 80–100)
MONOCYTES # BLD AUTO: 0.84 K/UL — SIGNIFICANT CHANGE UP (ref 0–0.9)
MONOCYTES NFR BLD AUTO: 9.8 % — SIGNIFICANT CHANGE UP (ref 2–14)
NEUTROPHILS # BLD AUTO: 5.29 K/UL — SIGNIFICANT CHANGE UP (ref 1.8–7.4)
NEUTROPHILS NFR BLD AUTO: 61.9 % — SIGNIFICANT CHANGE UP (ref 43–77)
NRBC # BLD: 0 /100 WBCS — SIGNIFICANT CHANGE UP
NRBC # FLD: 0 K/UL — SIGNIFICANT CHANGE UP
PLATELET # BLD AUTO: 290 K/UL — SIGNIFICANT CHANGE UP (ref 150–400)
RBC # BLD: 4.97 M/UL — SIGNIFICANT CHANGE UP (ref 3.8–5.2)
RBC # FLD: 15.5 % — HIGH (ref 10.3–14.5)
RH IG SCN BLD-IMP: POSITIVE — SIGNIFICANT CHANGE UP
SARS-COV-2 RNA SPEC QL NAA+PROBE: SIGNIFICANT CHANGE UP
T PALLIDUM AB TITR SER: NEGATIVE — SIGNIFICANT CHANGE UP
WBC # BLD: 8.55 K/UL — SIGNIFICANT CHANGE UP (ref 3.8–10.5)
WBC # FLD AUTO: 8.55 K/UL — SIGNIFICANT CHANGE UP (ref 3.8–10.5)

## 2021-01-01 PROCEDURE — 88307 TISSUE EXAM BY PATHOLOGIST: CPT | Mod: 26

## 2021-01-01 RX ORDER — SODIUM CHLORIDE 9 MG/ML
1000 INJECTION, SOLUTION INTRAVENOUS ONCE
Refills: 0 | Status: DISCONTINUED | OUTPATIENT
Start: 2021-01-01 | End: 2021-01-01

## 2021-01-01 RX ORDER — ALBUTEROL 90 UG/1
2 AEROSOL, METERED ORAL EVERY 6 HOURS
Refills: 0 | Status: DISCONTINUED | OUTPATIENT
Start: 2021-01-01 | End: 2021-01-03

## 2021-01-01 RX ORDER — DEXAMETHASONE 0.5 MG/5ML
4 ELIXIR ORAL EVERY 6 HOURS
Refills: 0 | Status: DISCONTINUED | OUTPATIENT
Start: 2021-01-01 | End: 2021-01-02

## 2021-01-01 RX ORDER — BUTORPHANOL TARTRATE 2 MG/ML
0.12 INJECTION, SOLUTION INTRAMUSCULAR; INTRAVENOUS EVERY 6 HOURS
Refills: 0 | Status: DISCONTINUED | OUTPATIENT
Start: 2021-01-01 | End: 2021-01-02

## 2021-01-01 RX ORDER — SODIUM CHLORIDE 9 MG/ML
1000 INJECTION, SOLUTION INTRAVENOUS
Refills: 0 | Status: DISCONTINUED | OUTPATIENT
Start: 2021-01-01 | End: 2021-01-02

## 2021-01-01 RX ORDER — HEPARIN SODIUM 5000 [USP'U]/ML
5000 INJECTION INTRAVENOUS; SUBCUTANEOUS EVERY 12 HOURS
Refills: 0 | Status: DISCONTINUED | OUTPATIENT
Start: 2021-01-01 | End: 2021-01-03

## 2021-01-01 RX ORDER — DIPHENHYDRAMINE HCL 50 MG
25 CAPSULE ORAL EVERY 6 HOURS
Refills: 0 | Status: COMPLETED | OUTPATIENT
Start: 2021-01-01 | End: 2021-11-30

## 2021-01-01 RX ORDER — DIPHENHYDRAMINE HCL 50 MG
25 CAPSULE ORAL EVERY 6 HOURS
Refills: 0 | Status: DISCONTINUED | OUTPATIENT
Start: 2021-01-01 | End: 2021-01-03

## 2021-01-01 RX ORDER — FAMOTIDINE 10 MG/ML
20 INJECTION INTRAVENOUS ONCE
Refills: 0 | Status: COMPLETED | OUTPATIENT
Start: 2021-01-01 | End: 2021-01-01

## 2021-01-01 RX ORDER — LANOLIN
1 OINTMENT (GRAM) TOPICAL EVERY 6 HOURS
Refills: 0 | Status: DISCONTINUED | OUTPATIENT
Start: 2021-01-01 | End: 2021-01-03

## 2021-01-01 RX ORDER — OXYTOCIN 10 UNIT/ML
333.33 VIAL (ML) INJECTION
Qty: 20 | Refills: 0 | Status: DISCONTINUED | OUTPATIENT
Start: 2021-01-01 | End: 2021-01-01

## 2021-01-01 RX ORDER — SODIUM CHLORIDE 9 MG/ML
1000 INJECTION, SOLUTION INTRAVENOUS
Refills: 0 | Status: DISCONTINUED | OUTPATIENT
Start: 2021-01-01 | End: 2021-01-01

## 2021-01-01 RX ORDER — MAGNESIUM HYDROXIDE 400 MG/1
30 TABLET, CHEWABLE ORAL
Refills: 0 | Status: DISCONTINUED | OUTPATIENT
Start: 2021-01-01 | End: 2021-01-03

## 2021-01-01 RX ORDER — CITRIC ACID/SODIUM CITRATE 300-500 MG
30 SOLUTION, ORAL ORAL ONCE
Refills: 0 | Status: DISCONTINUED | OUTPATIENT
Start: 2021-01-01 | End: 2021-01-01

## 2021-01-01 RX ORDER — METOCLOPRAMIDE HCL 10 MG
10 TABLET ORAL ONCE
Refills: 0 | Status: COMPLETED | OUTPATIENT
Start: 2021-01-01 | End: 2021-01-01

## 2021-01-01 RX ORDER — OXYCODONE HYDROCHLORIDE 5 MG/1
5 TABLET ORAL
Refills: 0 | Status: DISCONTINUED | OUTPATIENT
Start: 2021-01-01 | End: 2021-01-01

## 2021-01-01 RX ORDER — ONDANSETRON 8 MG/1
4 TABLET, FILM COATED ORAL EVERY 6 HOURS
Refills: 0 | Status: DISCONTINUED | OUTPATIENT
Start: 2021-01-01 | End: 2021-01-03

## 2021-01-01 RX ORDER — NALOXONE HYDROCHLORIDE 4 MG/.1ML
0.1 SPRAY NASAL
Refills: 0 | Status: DISCONTINUED | OUTPATIENT
Start: 2021-01-01 | End: 2021-01-02

## 2021-01-01 RX ORDER — TETANUS TOXOID, REDUCED DIPHTHERIA TOXOID AND ACELLULAR PERTUSSIS VACCINE, ADSORBED 5; 2.5; 8; 8; 2.5 [IU]/.5ML; [IU]/.5ML; UG/.5ML; UG/.5ML; UG/.5ML
0.5 SUSPENSION INTRAMUSCULAR ONCE
Refills: 0 | Status: DISCONTINUED | OUTPATIENT
Start: 2021-01-01 | End: 2021-01-03

## 2021-01-01 RX ORDER — OXYTOCIN 10 UNIT/ML
333.33 VIAL (ML) INJECTION
Qty: 20 | Refills: 0 | Status: DISCONTINUED | OUTPATIENT
Start: 2021-01-01 | End: 2021-01-02

## 2021-01-01 RX ORDER — SIMETHICONE 80 MG/1
80 TABLET, CHEWABLE ORAL EVERY 4 HOURS
Refills: 0 | Status: DISCONTINUED | OUTPATIENT
Start: 2021-01-01 | End: 2021-01-03

## 2021-01-01 RX ORDER — ACETAMINOPHEN 500 MG
1000 TABLET ORAL ONCE
Refills: 0 | Status: COMPLETED | OUTPATIENT
Start: 2021-01-01 | End: 2021-01-01

## 2021-01-01 RX ADMIN — Medication 1000 MILLIUNIT(S)/MIN: at 18:30

## 2021-01-01 RX ADMIN — FAMOTIDINE 20 MILLIGRAM(S): 10 INJECTION INTRAVENOUS at 14:53

## 2021-01-01 RX ADMIN — Medication 400 MILLIGRAM(S): at 19:44

## 2021-01-01 RX ADMIN — HEPARIN SODIUM 5000 UNIT(S): 5000 INJECTION INTRAVENOUS; SUBCUTANEOUS at 22:38

## 2021-01-01 RX ADMIN — Medication 1000 MILLIGRAM(S): at 20:00

## 2021-01-01 RX ADMIN — Medication 10 MILLIGRAM(S): at 14:53

## 2021-01-01 RX ADMIN — Medication 25 MILLIGRAM(S): at 22:38

## 2021-01-01 RX ADMIN — OXYCODONE HYDROCHLORIDE 5 MILLIGRAM(S): 5 TABLET ORAL at 22:00

## 2021-01-01 RX ADMIN — OXYCODONE HYDROCHLORIDE 5 MILLIGRAM(S): 5 TABLET ORAL at 21:46

## 2021-01-01 RX ADMIN — SIMETHICONE 80 MILLIGRAM(S): 80 TABLET, CHEWABLE ORAL at 22:37

## 2021-01-01 NOTE — OB NEONATOLOGY/PEDIATRICIAN DELIVERY SUMMARY - NSPEDSNEONOTESA_OBGYN_ALL_OB_FT
Peds was called for this 35 week gestation baby girl delivery for prematurity and repeat  to a 30 year old mother,  ( full term in ,  for twins in ,  at 35 weeks), B+, unremarkable prenatal labs, GBS negative on , COVID pending. Mother was admitted for  labor at 35 weeks for this delivery. Mom was admitted on  for  labor, S/P Beta, Mag. Mother has history of Asthma and UTI. Baby emerged vigorous with spontaneous cry, dried, stimulated, warmed, and suctioned mouth and nose with bulb suction. Apgar 8/9. Baby transferred to NICU for further management of prematurity. Mother wants to breast feed, wants Hep B vaccine, peds is [place]. Baby's skin temp prior to leaving OR is 36.5 C.

## 2021-01-01 NOTE — OB PROVIDER TRIAGE NOTE - NSHPPHYSICALEXAM_GEN_ALL_CORE
A/O x 3  NAD  Vital Signs Last 24 Hrs  T(C): 37.2 (01 Jan 2021 12:08), Max: 37.2 (01 Jan 2021 11:54)  T(F): 98.96 (01 Jan 2021 12:08), Max: 99 (01 Jan 2021 11:54)  HR: 88 (01 Jan 2021 12:57) (88 - 117)  BP: 102/58 (01 Jan 2021 12:57) (102/58 - 128/64)  BP(mean): --  RR: 16 (01 Jan 2021 11:54) (16 - 16)  SpO2: --  Abdomen is soft NT and gravid with no ctx palpable  SSE: No pooling, mucoid discharge present  with Nitrazine/Ferning Negative  Cervix visually closed A/O x 3  NAD  Vital Signs Last 24 Hrs  T(C): 37.2 (01 Jan 2021 12:08), Max: 37.2 (01 Jan 2021 11:54)  T(F): 98.96 (01 Jan 2021 12:08), Max: 99 (01 Jan 2021 11:54)  HR: 88 (01 Jan 2021 12:57) (88 - 117)  BP: 102/58 (01 Jan 2021 12:57) (102/58 - 128/64)  BP(mean): --  RR: 16 (01 Jan 2021 11:54) (16 - 16)  SpO2: --  Heart: RRR  Lungs BCTA  Abdomen is soft NT and gravid with no ctx palpable  SSE: No pooling, mucoid discharge present  with Nitrazine/Ferning Negative  Cervix visually closed  SVE: 1/50/-3 Intact  FHR: Cat 1, reactive  TOCO: ctx q 3 minutes  Scheduled 1/13 secondary to previous classical C/S

## 2021-01-01 NOTE — OB PROVIDER H&P - NS_OBGYNHISTORY_OBGYN_ALL_OB_FT
(7.12 ) FT    H/O:     x 2 (Classical and repeat )  2009 twins Ptl classical c/s 2#4/2#6  rec'd 2units PRBC  baby passed @ 22 months-menigitis    12/10/2015 rpt c/s PPROM  35.3 weeks 6#12

## 2021-01-01 NOTE — OB RN PATIENT PROFILE - NAME OF FATHER, OB PROFILE
"[facility]  ED Nurse Handoff Report    ED Chief complaint: Generalized Weakness (Seen in ED Monday and discharged. Diagnosed by PMD Tuesday with gastritis. Today awoke with diaphoresis and possible blood in stool. Weakness continues and pt states she's unable to care for self.)      ED Diagnosis:   Final diagnoses:   Community acquired pneumonia       Code Status: TBD    Allergies:   Allergies   Allergen Reactions     Ambien [Zolpidem Tartrate] Visual Disturbance     Hallucinations and fell out of bed at night.     Penicillins Hives     Definity      Caused a syncopal episode.     Sulfa Drugs Itching     Cymbalta Rash     Fluconazole Rash       Activity level - Baseline/Home:  Stand with Assist    Activity Level - Current:   Stand with Assist     Needed?: No    Isolation: Yes-pt states she has chronic diarrhea but has had no stool episodes in the ED  Infection: chronic diarrhea per pt  Other   Bariatric?: No    Vital Signs:   Vitals:    07/21/18 0738 07/21/18 0746   BP: (!) 178/95    Pulse: 69    Resp: 16    Temp: 98  F (36.7  C)    TempSrc: Oral    SpO2:  96%   Height: 1.664 m (5' 5.5\")        Cardiac Rhythm: ,        Pain level: 0-10 Pain Scale: 0    Is this patient confused?: No   [place] - Suicide Severity Rating Scale Completed?  Yes  If yes, what color did the patient score?  White    Patient Report: Initial Complaint: Pt is an 81 year old female with history of afib and COPD who presents with generalized weakness. The patient stated she has felt weak all over, has had a bloated stomach, a lack of appetite, and diffiuclty breathing for the past week. This morning, she woke up sweating and belching, prompting her to call the ambulance to bring her to the ED. The patient just recently moved into an assisted living home, and state that she has felt these symptoms since moving in. The patient is currently on lasix and eliquis.  Focused Assessment: Pt c/o weakness and SOB, although pt " transfers well and appears to be breathing freely. Pt upset at having to come to the ED for the 3rd time and refused to go home stating she needed to stay in the hospital. Pt is a very difficult IV start.  Tests Performed: blood, cath urine, xray  Abnormal Results: see EPIC  Treatments provided: oral antibiotic    Family Comments: no family present    OBS brochure/video discussed/provided to patient: No-pt states she doesn't need to watch it since she's seen it before    ED Medications:   Medications   doxycycline (VIBRAMYCIN) capsule 100 mg (not administered)       Drips infusing?:  No    For the majority of the shift this patient was Green.   Interventions performed were N/A.    Severe Sepsis OR Septic Shock Diagnosis Present: no      ED NURSE PHONE NUMBER: [phone]          Chino Steele

## 2021-01-01 NOTE — OB PROVIDER H&P - PSH
Encounter for diagnostic endoscopy  GERD  H/O:   2009 twins Ptl classical c/s 2#4/2#6  rec'd 2units PRBC  baby passed @ 22 months-menigitis    12/10/2015 rpt c/s PPROM  35.3 weeks 6#12

## 2021-01-01 NOTE — OB RN DELIVERY SUMMARY - NS_SEPSISRSKCALC_OBGYN_ALL_OB_FT
EOS calculated successfully. EOS Risk Factor: 0.5/1000 live births ([facility] national incidence); GA=35w;Temp=99.1; ROM=0.017; GBS='Unknown'; Antibiotics='No antibiotics or any antibiotics < 2 hrs prior to birth'

## 2021-01-01 NOTE — OB RN TRIAGE NOTE - CURRENT PREGNANCY COMPLICATIONS, OB PROFILE
Incompetent Cervix/Cervical Insufficiency adm 10/16  s/p beta and procardia/Incompetent Cervix/Cervical Insufficiency

## 2021-01-01 NOTE — OB PROVIDER TRIAGE NOTE - NSOBPROVIDERNOTE_OBGYN_ALL_OB_FT
29 yo Old at 35 wks GA , - in PTL for Repeat c/S   @ 1402- Logistical huddle called with  Anaesthesia, Nursing , OB Attending Dr Weinstein and OB , with NICU aware  Plan for repeat C/S to go  - Routine orders

## 2021-01-01 NOTE — OB PROVIDER TRIAGE NOTE - CURRENT PREGNANCY COMPLICATIONS, OB PROFILE
adm 10/16  s/p beta and procardia/Incompetent Cervix/Cervical Insufficiency adm 10/16  s/p beta and procardia/Incompetent Cervix/Cervical Insufficiency/ Labor/Other

## 2021-01-01 NOTE — OB PROVIDER DELIVERY SUMMARY - NSPROVIDERDELIVERYNOTE_OBGYN_ALL_OB_FT
rLTCS at 35wk for patient presenting in labor with painful ctxns in setting of prior classical  section and repeat  section. Viable female infant delivered from vertex presentation; Apgars 8/9, weight 2460g. LUIS thin with dense adhesions to bladder anteriorly. Adhesions noted between fascian and anterior abdominal wall. Grossly normal bilateral fallopian tubes and ovaries. Placenta delivered spontaneously. Defect at site of prior vertical incision noted and repaired in interrupted fashion with 0 vicryl. Hysterotomy closed with 0 vicryl in one layer. Marcelo placed over hysterotomy and bladder flap. Muscle reapproximated with 2.0 vicryl.     EBL 218cc  IVF 2500cc  UOP 60cc  [place] PGY4

## 2021-01-01 NOTE — OB PROVIDER H&P - ATTENDING COMMENTS
PT ADMITTED WITH PRE-TERM LABOR  RECENT ADMISSION FOR SAME BUT NOW FEELS THE CONTRACTIONS  COUNSELING GIVEN  RECOMMENDED TO PROCEED WITH C/SECTION AT THIS TIME  RISKS/BENEFITS/POSSIBLE COMPLICATIONS/RECOVERY DISCUSSED  ALL QUESTIONS AND CONCERNS DISCUSSED  AGREED TO PROCEED WITH C/SEC

## 2021-01-01 NOTE — OB PROVIDER H&P - ASSESSMENT
29 yo Old at 35 wks GA , - fo r repeat C/S in PTL  - Preop Orders  - Covid x 2 sent  - Type and cross x 2  - Inhaler prn  - Logistical huddle called for Unscheduled /S to go with Dr Weinstein

## 2021-01-01 NOTE — OB PROVIDER H&P - NSHPPHYSICALEXAM_GEN_ALL_CORE
A/O x 3  NAD  Vital Signs Last 24 Hrs  T(C): 37.2 (01 Jan 2021 12:08), Max: 37.2 (01 Jan 2021 11:54)  T(F): 98.96 (01 Jan 2021 12:08), Max: 99 (01 Jan 2021 11:54)  HR: 88 (01 Jan 2021 12:57) (88 - 117)  BP: 102/58 (01 Jan 2021 12:57) (102/58 - 128/64)  BP(mean): --  RR: 16 (01 Jan 2021 11:54) (16 - 16)  SpO2: --  Heart: RRR  Lungs BCTA  Abdomen is soft NT and gravid with no ctx palpable  SSE: No pooling, mucoid discharge present  with Nitrazine/Ferning Negative  Cervix visually closed  SVE: 1/50/-3 Intact  FHR: Cat 1, reactive  TOCO: ctx q 3 minutes  Scheduled 1/13 /21 secondary to previous classical C/S

## 2021-01-01 NOTE — OB RN PATIENT PROFILE - 'COMMUNITY AGENCIES/SUPPORT GROUPS, OB PROFILE
Outpatient Speech Language Pathology   Peds Speech Language Treatment Note       Patient Name: Waldemar Arita  : 2016  MRN: [id number]  Today's Date: 10/3/2019      Visit Date: 10/03/2019      Patient Active Problem List   Diagnosis   • Premature infant       Visit Dx:    ICD-10-CM ICD-9-CM   1. Speech and language deficits F80.9 784.59    R47.9 315.31                       OP SLP Assessment/Plan - 10/03/19 1600        SLP Assessment    Functional Problems  Speech Language- Peds   -PH (r) LA (t) PH (c)    Clinical Impression Comments  Waldemar is producing /t/ and /d/ phonemes consistently.   -PH (r) LA (t) PH (c)       SLP Plan    Plan Comments  Continue goals.   -PH (r) LA (t) PH (c)      User Key  (r) = Recorded By, (t) = Taken By, (c) = Cosigned By    Initials Name Provider Type     Reyna Pascual, CCC-SLP Speech and Language Pathologist    Lotus Douglas, Speech Therapy Student Speech Therapy Student          SLP OP Goals     Row Name 10/03/19 1557          Subjective Comments    Subjective Comments  Waldemar was happy and receptive to all therapeutic activities and testing in today's session.  -PH (r) LA (t) PH (c)        Short-Term Goals    STG- 1  The child will identify age appropriate vocabulary using age appropriate means with 85% accuracy for 3 sessions.   -PH (r) LA (t) PH (c)     Status: STG- 1  Progressing as expected  -PH (r) LA (t) PH (c)     Comments: STG- 1  The child correctly and consistently identifies farm animals, objects, vehicles, boats and characters (that are of interest to him).  Waldemar correctly identified names of objects or objects (given their purpose), from a field of two with 100% accuracy.    -PH (r) LA (t) PH (c)     STG- 2  The child will imitate CV sequence 5 times per session for 3 sessions.   -PH (r) LA (t) PH (c)     Status: STG- 2  Progressing as expected  -PH (r) LA (t) PH (c)     Comments: STG- 2  CV sequences were targeted using the Sent[employer] cards.  Waldemar exhibits most difficulty with bilabial phonemes /b/, /p/, /m/. ST plans to work on oral motor conditioning in the next session to encourage the lips-together motion. The child imitated CV sequences correctly in 5 out of 25 opportunities.  -PH (r) LA (t) PH (c)     STG- 3  Parent education and HEP.   -PH (r) LA (t) PH (c)     Status: STG- 3  New  -PH (r) LA (t) PH (c)     STG- 4  Goals added as appropriate.   -PH (r) LA (t) PH (c)     Status: STG- 4  New  -PH (r) LA (t) PH (c)     STG- 5  The child will comply to participate and complete 3 age appropriate activities per session for 3 sessions.   -PH (r) LA (t) PH (c)     Status: STG- 5  Achieved  -PH (r) LA (t) PH (c)     Comments: STG- 5  The child has met this goal consistently.  -PH (r) LA (t) PH (c)     STG- 6  Receptive language will be evaluated using the Receptive 1-Word Picture Vocabulary Test, Fourth Edition.  -PH (r) LA (t) PH (c)     Status: STG- 6  New  -PH (r) LA (t) PH (c)     Comments: STG- 6  Evaluation will be administered in the next session.  -PH (r) LA (t) PH (c)        SLP Time Calculation    SLP Goal Re-Cert Due Date  10/18/19  -PH (r) LA (t) PH (c)       User Key  (r) = Recorded By, (t) = Taken By, (c) = Cosigned By    Initials Name Provider Type    Reyna Luna CCC-SLP Speech and Language Pathologist    Lotus Douglas, Speech Therapy Student Speech Therapy Student          OP SLP Education     Row Name 10/03/19 1600       Education    Barriers to Learning  No barriers identified  -PH (r) LA (t) PH (c)    Education Comments  Mother attended the session.  -PH (r) LA (t) PH (c)      User Key  (r) = Recorded By, (t) = Taken By, (c) = Cosigned By    Initials Name Effective Dates    Reyna Luna CCC-SLP 08/02/16 -     Lotus Douglas Speech Therapy Student 09/03/19 -              Time Calculation:                       Reyna Pascual CCC-SLP  10/3/2019   N/A

## 2021-01-01 NOTE — OB RN INTRAOPERATIVE NOTE - NS_ROOMOUT_OBGYN_ALL_OB_DT
LVM with name and callback number. Did also make NOB call on 10/25/17 (See NOB encounter). Sending letter.     Iwona Mendiola, LEN     01-Jan-2021 17:51

## 2021-01-01 NOTE — OB PROVIDER H&P - HISTORY OF PRESENT ILLNESS
31 yo Old at 35 wks GA , - Patient had a twin delivery at 29 wks GA where one twin  at 22 months GA (S/P Classical C/S). Patient c/o loss of mucoid discharge at 0930 AM , denies vaginal fluid like discharge. Denies VB,  reports uncomfortable  CTX.  q 3-4 minutes  and reports good FM's  PNC: Dr Corinna WANG History of antepartum admission 2020 received antepartum steroids, and tocolysis  PT reports NPO status  Asthma - Inhaler PRN  H/O previous  Blood transfusion

## 2021-01-01 NOTE — OB PROVIDER TRIAGE NOTE - HISTORY OF PRESENT ILLNESS
31 yo Old at 35 wks GA , - Patient had a twin delivery at 29 wks GA where one twin  at 22 months GA (S/P Classical C/S). Patient c/o loss of mucoid discharge at 0930 AM , denies vaginal fluid like discharge. Denies VB, CTX. and reports good FM's  PNC: Dr Corinna WANG History of antepartum admission 2020 received antepartum steroids, and tocolysis   29 yo Old at 35 wks GA , - Patient had a twin delivery at 29 wks GA where one twin  at 22 months GA (S/P Classical C/S). Patient c/o loss of mucoid discharge at 0930 AM , denies vaginal fluid like discharge. Denies VB,  reports uncomfortable  CTX.  q 3-4 minutes  and reports good FM's  PNC: Dr Corinna WANG History of antepartum admission 2020 received antepartum steroids, and tocolysis  PT reports NPO

## 2021-01-01 NOTE — OB PROVIDER TRIAGE NOTE - NS_OBGYNHISTORY_OBGYN_ALL_OB_FT
H/O:   2009 twins Ptl classical c/s 2#4/2#6  rec'd 2units PRBC  baby passed @ 22 months-menigitis    12/10/2015 rpt c/s PPROM  35.3 weeks 6#12   (7.12 ) FT    H/O:     x 2 (Classical and repeat )  2009 twins Ptl classical c/s 2#4/2#6  rec'd 2units PRBC  baby passed @ 22 months-menigitis    12/10/2015 rpt c/s PPROM  35.3 weeks 6#12

## 2021-01-01 NOTE — OB RN DELIVERY SUMMARY - NS_BREASTACTIONA_OBGYN_ALL_OB
Patient requesting a medication refill.   Medication Lisdexamfetamine Dimesylate (VYVANSE) 10 MG CAPS [[id number]]      Pharmacy     [facility]/pharmacy [address] [facility] [phone]    Last office visit: 03/04/2019  Next office visit: 9/4/2019 No action was needed

## 2021-01-02 ENCOUNTER — TRANSCRIPTION ENCOUNTER (OUTPATIENT)
Age: 31
End: 2021-01-02

## 2021-01-02 LAB
ANISOCYTOSIS BLD QL: SLIGHT — SIGNIFICANT CHANGE UP
BASOPHILS # BLD AUTO: 0 K/UL — SIGNIFICANT CHANGE UP (ref 0–0.2)
BASOPHILS NFR BLD AUTO: 0 % — SIGNIFICANT CHANGE UP (ref 0–2)
EOSINOPHIL # BLD AUTO: 0 K/UL — SIGNIFICANT CHANGE UP (ref 0–0.5)
EOSINOPHIL NFR BLD AUTO: 0 % — SIGNIFICANT CHANGE UP (ref 0–6)
HCT VFR BLD CALC: 36.2 % — SIGNIFICANT CHANGE UP (ref 34.5–45)
HGB BLD-MCNC: 11.6 G/DL — SIGNIFICANT CHANGE UP (ref 11.5–15.5)
IANC: 5.28 K/UL — SIGNIFICANT CHANGE UP (ref 1.5–8.5)
LYMPHOCYTES # BLD AUTO: 1.49 K/UL — SIGNIFICANT CHANGE UP (ref 1–3.3)
LYMPHOCYTES # BLD AUTO: 17.3 % — SIGNIFICANT CHANGE UP (ref 13–44)
MCHC RBC-ENTMCNC: 26.9 PG — LOW (ref 27–34)
MCHC RBC-ENTMCNC: 32 GM/DL — SIGNIFICANT CHANGE UP (ref 32–36)
MCV RBC AUTO: 83.8 FL — SIGNIFICANT CHANGE UP (ref 80–100)
MICROCYTES BLD QL: SLIGHT — SIGNIFICANT CHANGE UP
MONOCYTES # BLD AUTO: 1.02 K/UL — HIGH (ref 0–0.9)
MONOCYTES NFR BLD AUTO: 11.8 % — SIGNIFICANT CHANGE UP (ref 2–14)
NEUTROPHILS # BLD AUTO: 5.65 K/UL — SIGNIFICANT CHANGE UP (ref 1.8–7.4)
NEUTROPHILS NFR BLD AUTO: 62.7 % — SIGNIFICANT CHANGE UP (ref 43–77)
NEUTS BAND # BLD: 2.7 % — SIGNIFICANT CHANGE UP (ref 0–6)
PLAT MORPH BLD: ABNORMAL
PLATELET # BLD AUTO: 231 K/UL — SIGNIFICANT CHANGE UP (ref 150–400)
PLATELET COUNT - ESTIMATE: NORMAL — SIGNIFICANT CHANGE UP
RBC # BLD: 4.32 M/UL — SIGNIFICANT CHANGE UP (ref 3.8–5.2)
RBC # FLD: 15.5 % — HIGH (ref 10.3–14.5)
RBC BLD AUTO: ABNORMAL
SARS-COV-2 IGG SERPL QL IA: NEGATIVE — SIGNIFICANT CHANGE UP
SARS-COV-2 IGM SERPL IA-ACNC: 0.68 INDEX — SIGNIFICANT CHANGE UP
VARIANT LYMPHS # BLD: 5.5 % — SIGNIFICANT CHANGE UP (ref 0–6)
WBC # BLD: 8.64 K/UL — SIGNIFICANT CHANGE UP (ref 3.8–10.5)
WBC # FLD AUTO: 8.64 K/UL — SIGNIFICANT CHANGE UP (ref 3.8–10.5)

## 2021-01-02 RX ORDER — ALBUTEROL 90 UG/1
0 AEROSOL, METERED ORAL
Qty: 0 | Refills: 0 | DISCHARGE

## 2021-01-02 RX ORDER — IBUPROFEN 200 MG
1 TABLET ORAL
Qty: 0 | Refills: 0 | DISCHARGE
Start: 2021-01-02

## 2021-01-02 RX ORDER — KETOROLAC TROMETHAMINE 30 MG/ML
30 SYRINGE (ML) INJECTION EVERY 6 HOURS
Refills: 0 | Status: DISCONTINUED | OUTPATIENT
Start: 2021-01-02 | End: 2021-01-02

## 2021-01-02 RX ORDER — OXYCODONE HYDROCHLORIDE 5 MG/1
5 TABLET ORAL
Refills: 0 | Status: COMPLETED | OUTPATIENT
Start: 2021-01-02 | End: 2021-01-09

## 2021-01-02 RX ORDER — SENNA PLUS 8.6 MG/1
1 TABLET ORAL
Refills: 0 | Status: DISCONTINUED | OUTPATIENT
Start: 2021-01-02 | End: 2021-01-03

## 2021-01-02 RX ORDER — FERROUS SULFATE 325(65) MG
325 TABLET ORAL DAILY
Refills: 0 | Status: DISCONTINUED | OUTPATIENT
Start: 2021-01-02 | End: 2021-01-03

## 2021-01-02 RX ORDER — ACETAMINOPHEN 500 MG
3 TABLET ORAL
Qty: 0 | Refills: 0 | DISCHARGE
Start: 2021-01-02

## 2021-01-02 RX ORDER — IBUPROFEN 200 MG
600 TABLET ORAL EVERY 6 HOURS
Refills: 0 | Status: DISCONTINUED | OUTPATIENT
Start: 2021-01-02 | End: 2021-01-03

## 2021-01-02 RX ORDER — OXYCODONE HYDROCHLORIDE 5 MG/1
5 TABLET ORAL ONCE
Refills: 0 | Status: DISCONTINUED | OUTPATIENT
Start: 2021-01-02 | End: 2021-01-03

## 2021-01-02 RX ORDER — ACETAMINOPHEN 500 MG
975 TABLET ORAL
Refills: 0 | Status: DISCONTINUED | OUTPATIENT
Start: 2021-01-02 | End: 2021-01-03

## 2021-01-02 RX ORDER — IBUPROFEN 200 MG
600 TABLET ORAL EVERY 6 HOURS
Refills: 0 | Status: COMPLETED | OUTPATIENT
Start: 2021-01-02 | End: 2021-12-01

## 2021-01-02 RX ORDER — FERROUS SULFATE 325(65) MG
1 TABLET ORAL
Qty: 0 | Refills: 0 | DISCHARGE
Start: 2021-01-02

## 2021-01-02 RX ADMIN — Medication 30 MILLIGRAM(S): at 12:22

## 2021-01-02 RX ADMIN — Medication 30 MILLIGRAM(S): at 04:39

## 2021-01-02 RX ADMIN — Medication 975 MILLIGRAM(S): at 16:49

## 2021-01-02 RX ADMIN — Medication 1 TABLET(S): at 12:19

## 2021-01-02 RX ADMIN — Medication 600 MILLIGRAM(S): at 21:14

## 2021-01-02 RX ADMIN — Medication 975 MILLIGRAM(S): at 09:00

## 2021-01-02 RX ADMIN — Medication 30 MILLIGRAM(S): at 04:54

## 2021-01-02 RX ADMIN — MAGNESIUM HYDROXIDE 30 MILLILITER(S): 400 TABLET, CHEWABLE ORAL at 21:14

## 2021-01-02 RX ADMIN — Medication 975 MILLIGRAM(S): at 17:30

## 2021-01-02 RX ADMIN — Medication 325 MILLIGRAM(S): at 12:19

## 2021-01-02 RX ADMIN — Medication 30 MILLIGRAM(S): at 13:00

## 2021-01-02 RX ADMIN — Medication 600 MILLIGRAM(S): at 21:40

## 2021-01-02 RX ADMIN — HEPARIN SODIUM 5000 UNIT(S): 5000 INJECTION INTRAVENOUS; SUBCUTANEOUS at 21:14

## 2021-01-02 RX ADMIN — HEPARIN SODIUM 5000 UNIT(S): 5000 INJECTION INTRAVENOUS; SUBCUTANEOUS at 09:00

## 2021-01-02 NOTE — DISCHARGE NOTE OB - HOSPITAL COURSE
29 y/o p1304 s/p repeat csection at 35 wks in  labor  had repeat csection uncomplicated  PP course uncomplicated  dc home on ppd #2 to fup with her obgyn in 1-2 wks

## 2021-01-02 NOTE — DISCHARGE NOTE OB - CARE PROVIDER_API CALL
Kohanim, Behnam  OBSTETRICS AND GYNECOLOGY  [address], [address]  [place] [address]  Phone: [phone]  Fax: [phone]  Follow Up Time:

## 2021-01-02 NOTE — PROGRESS NOTE ADULT - SUBJECTIVE AND OBJECTIVE BOX
POD#1    S: 31yo POD#1 s/p LTCS . Her pain is well controlled. She is tolerating a regular diet and passing flatus. Denies N/V. Denies CP/SOB/lightheadedness/dizziness.   She is ambulating without difficulty.   Voiding spontaneously.     O:   Vital Signs Last 24 Hrs  T(C): 36.9 (02 Jan 2021 11:12), Max: 37.3 (01 Jan 2021 14:18)  T(F): 98.4 (02 Jan 2021 11:12), Max: 99.2 (02 Jan 2021 00:21)  HR: 95 (02 Jan 2021 11:12) (68 - 117)  BP: 104/67 (02 Jan 2021 11:12) (101/63 - 136/71)  BP(mean): 90 (01 Jan 2021 20:00) (61 - 90)  RR: 18 (02 Jan 2021 11:12) (13 - 19)  SpO2: 98% (02 Jan 2021 11:12) (98% - 100%)    Labs:  Blood type: B Positive  Rubella IgG: RPR: Negative                          11.6   8.64 >-----------< 231    ( 01-02 @ 07:33 )             36.2                        13.2   8.55 >-----------< 290    ( 01-01 @ 14:30 )             42.1                  PE:  General: NAD  Abdomen: Mildly distended, appropriately tender, incision c/d/i.  Extremities: No erythema, no pitting edema    A/P: 31yo POD#1 s/p LTCS.  Patient is stable and doing well post-operatively.    - Continue regular diet.  - Increase ambulation.  - Continue motrin, tylenol, oxycodone PRN for pain control.  vs. continue PCEA for pain.  -possible dc home tomorrow if stable

## 2021-01-02 NOTE — DISCHARGE NOTE OB - PLAN OF CARE
s/p csection at 35 wks  encourage ambulation and breastfeeding  wound care  fup in 1-2 wks with obgyn s/p csection,full recovery s/p csection at 35 wks

## 2021-01-02 NOTE — DISCHARGE NOTE OB - MEDICATION SUMMARY - MEDICATIONS TO TAKE
I will START or STAY ON the medications listed below when I get home from the hospital:    acetaminophen 325 mg oral tablet  -- 3 tab(s) by mouth   -- Indication: For H/O:     ibuprofen 600 mg oral tablet  -- 1 tab(s) by mouth every 6 hours  -- Indication: For H/O:     ferrous sulfate 325 mg (65 mg elemental iron) oral tablet  -- 1 tab(s) by mouth once a day  -- Indication: For Other pregnancy-related conditions, antepartum

## 2021-01-02 NOTE — DISCHARGE NOTE OB - NS OB DC TDAP REASON NOT RECEIVED

## 2021-01-02 NOTE — DISCHARGE NOTE OB - PATIENT PORTAL LINK FT
You can access the FollowMyHealth Patient Portal offered by [facility] by registering at the following website: [email or web address]. By joining [employer]’s FollowMyHealth portal, you will also be able to view your health information using other applications (apps) compatible with our system.

## 2021-01-02 NOTE — DISCHARGE NOTE OB - CARE PLAN
Principal Discharge DX:	Delivery of pregnancy by  section  Goal:	s/p csection,full recovery  Assessment and plan of treatment:	s/p csection at 35 wks  encourage ambulation and breastfeeding  wound care  fup in 1-2 wks with obgyn  Secondary Diagnosis:	H/O:   Goal:	s/p csection at 35 wks  Secondary Diagnosis:	 labor in third trimester with  delivery, single or unspecified fetus

## 2021-01-02 NOTE — PROGRESS NOTE ADULT - SUBJECTIVE AND OBJECTIVE BOX
Postop Day  __1_ s/p   C- Section    THERAPY:  [  ] Spinal morphine   [ x ] Epidural morphine   [  ] IV PCA Hydromorphone 1 mg/ml      Sedation Score:	  [ x ] Alert	    [  ] Drowsy        [  ] Arousable	[  ] Asleep	[  ] Unresponsive    Side Effects:	  [ x ] None	     [  ] Nausea        [  ] Pruritus        [  ] Weakness   [  ] Numbness        ASSESSMENT/ PLAN   [   ] Discontinue         [  ] Continue  [ x ]Documentation and Verification of current medications     Comments:       Patient is doing well.  OOBAA. Tolerating clears.  Pain is tolerable.  No residual anesthetic issues or complications noted.

## 2021-01-03 VITALS
TEMPERATURE: 99 F | RESPIRATION RATE: 18 BRPM | HEART RATE: 98 BPM | OXYGEN SATURATION: 100 % | SYSTOLIC BLOOD PRESSURE: 126 MMHG | DIASTOLIC BLOOD PRESSURE: 80 MMHG

## 2021-01-03 RX ORDER — OXYCODONE HYDROCHLORIDE 5 MG/1
5 TABLET ORAL
Refills: 0 | Status: DISCONTINUED | OUTPATIENT
Start: 2021-01-03 | End: 2021-01-03

## 2021-01-03 RX ORDER — OXYCODONE HYDROCHLORIDE 5 MG/1
1 TABLET ORAL
Qty: 10 | Refills: 0
Start: 2021-01-03

## 2021-01-03 RX ADMIN — OXYCODONE HYDROCHLORIDE 5 MILLIGRAM(S): 5 TABLET ORAL at 06:18

## 2021-01-03 RX ADMIN — Medication 975 MILLIGRAM(S): at 01:00

## 2021-01-03 RX ADMIN — OXYCODONE HYDROCHLORIDE 5 MILLIGRAM(S): 5 TABLET ORAL at 06:46

## 2021-01-03 RX ADMIN — Medication 975 MILLIGRAM(S): at 00:34

## 2021-01-03 RX ADMIN — Medication 600 MILLIGRAM(S): at 12:48

## 2021-01-03 RX ADMIN — Medication 600 MILLIGRAM(S): at 05:00

## 2021-01-03 RX ADMIN — Medication 600 MILLIGRAM(S): at 04:29

## 2021-01-03 RX ADMIN — HEPARIN SODIUM 5000 UNIT(S): 5000 INJECTION INTRAVENOUS; SUBCUTANEOUS at 09:32

## 2021-01-03 RX ADMIN — Medication 975 MILLIGRAM(S): at 09:31

## 2021-01-03 RX ADMIN — Medication 1 TABLET(S): at 11:27

## 2021-01-03 RX ADMIN — Medication 975 MILLIGRAM(S): at 10:30

## 2021-01-03 RX ADMIN — Medication 600 MILLIGRAM(S): at 13:40

## 2021-01-03 RX ADMIN — Medication 325 MILLIGRAM(S): at 11:28

## 2021-01-03 NOTE — PROGRESS NOTE ADULT - SUBJECTIVE AND OBJECTIVE BOX
POD#2    S: 29yo POD#2 s/p LTCS . Her pain is well controlled. She is tolerating a regular diet and passing flatus had BM Denies N/V. Denies CP/SOB/lightheadedness/dizziness.   She is ambulating without difficulty.   Voiding spontaneously.     O:   Vital Signs Last 24 Hrs  T(C): 36.7 (03 Jan 2021 06:28), Max: 36.9 (02 Jan 2021 14:06)  T(F): 98.1 (03 Jan 2021 06:28), Max: 98.4 (02 Jan 2021 14:06)  HR: 96 (03 Jan 2021 06:28) (80 - 96)  BP: 108/72 (03 Jan 2021 06:28) (104/69 - 108/72)  BP(mean): --  RR: 18 (03 Jan 2021 06:28) (17 - 18)  SpO2: 100% (03 Jan 2021 06:28) (100% - 100%)    Labs:  Blood type: B Positive  Rubella IgG: RPR: Negative                          11.6   8.64 >-----------< 231    ( 01-02 @ 07:33 )             36.2                        13.2   8.55 >-----------< 290    ( 01-01 @ 14:30 )             42.1                  PE:  General: NAD  Abdomen: Mildly distended, appropriately tender, incision c/d/i.  Extremities: No erythema, no pitting edema    A/P: 29yo POD#2 s/p LTCS.  Patient is stable and doing well post-operatively.    - Continue regular diet.  - Increase ambulation.  - Continue motrin, tylenol, oxycodone PRN for pain control  - dc home today  -fup in 2 wks for wound check

## 2021-01-09 DIAGNOSIS — Z01.818 ENCOUNTER FOR OTHER PREPROCEDURAL EXAMINATION: ICD-10-CM

## 2021-01-10 ENCOUNTER — APPOINTMENT (OUTPATIENT)
Dept: DISASTER EMERGENCY | Facility: CLINIC | Age: 31
End: 2021-01-10

## 2021-01-24 LAB — SURGICAL PATHOLOGY STUDY: SIGNIFICANT CHANGE UP

## 2021-02-12 ENCOUNTER — OUTPATIENT (OUTPATIENT)
Dept: OUTPATIENT SERVICES | Facility: HOSPITAL | Age: 31
LOS: 1 days | Discharge: LEFT BEFORE DISCHARGE | End: 2021-02-12

## 2021-02-12 DIAGNOSIS — Z01.818 ENCOUNTER FOR OTHER PREPROCEDURAL EXAMINATION: Chronic | ICD-10-CM

## 2021-02-12 DIAGNOSIS — Z98.891 HISTORY OF UTERINE SCAR FROM PREVIOUS SURGERY: Chronic | ICD-10-CM

## 2021-09-20 NOTE — ED ADULT TRIAGE NOTE - BP NONINVASIVE DIASTOLIC (MM HG)
-continue antibiotics/treatment per ID/primary team -continue antibiotics/treatment per ID/primary team -continue antibiotics/treatment per ID/primary team -continue antibiotics/treatment per ID/primary team 89

## 2021-10-20 NOTE — ED PROVIDER NOTE - PLAN OF CARE
1) Please follow up with your Primary Care Provider in 24-48 hours  2) Seek immediate medical care for any new or returning symptoms including but not limited to severe pain, persistent vomiting, high fevers  3) Take Pepcid once a day for 14 days  4) Please contact Gastroenterology within 24-48 Nsaids Pregnancy And Lactation Text: These medications are considered safe up to 30 weeks gestation. It is excreted in breast milk.

## 2022-01-10 NOTE — OB RN PREOPERATIVE CHECKLIST - NSSDAENDDT_GEN_ALL_CORE
"Received high Priority Escalation via COVID Surveillance Program. Patient submitted the following abnormal data SOB 3/5 and at rest 4/5 . Patient called. Patient reports the following condition unchanged from when she was in ED yesterday. States she was told "SOB is normal" at ED. Speaking in complete sentences. No audible wheezes noted. Advised KEMAR would call and advised to call OOC back with concerns or worsening symptoms. Verbalized understanding.     Pt escalated to KEMAR, RHIANNA Hidalgo NP  for additional assessment and care directives.  Secure chat sent and acknowledged. Will contact pt. See KEMAR note.    Reason for Disposition   [1] MODERATE difficulty breathing (e.g., speaks in phrases, SOB even at rest, pulse 100 - 120) AND [2] NOT new-onset or worse than normal    Additional Information   Negative: SEVERE difficulty breathing (e.g., struggling for each breath, speaks in single words, pulse > 120)   Negative: Bluish (or gray) lips or face now   Negative: Difficult to awaken or acting confused (e.g., disoriented, slurred speech)   Negative: Slow, shallow and weak breathing   Negative: Sounds like a life-threatening emergency to the triager   Negative: [1] MODERATE difficulty breathing (e.g., speaks in phrases, SOB even at rest, pulse 100 - 120) AND [2] new-onset or worse than normal   Negative: [1] MODERATE difficulty breathing AND [2] oxygen level (e.g., pulse oximetry) 91 to 94 percent   Negative: Oxygen level (e.g., pulse oximetry) 90 percent or lower   Negative: Patient sounds very sick or weak to the triager    Protocols used: OXYGEN MONITORING AND [id number]      " 01-Jan-2021 15:15

## 2022-03-11 NOTE — OB RN PATIENT PROFILE - PT NEEDS ASSIST
Comprehensive Nutrition Assessment    Type and Reason for Visit:  Initial,RD Nutrition Re-Screen/LOS (LOS day 8)    Nutrition Recommendations/Plan:   Monitor PO intake. Follow-up and answer questions regarding nutrition therapy as necessary. Consider starting nutrition supplement if appetite/intake does not improve. Nutrition Assessment:      Pt. nutritionally compromised AEB LOS day 8, slightly decreased intake/appetite. At risk for further nutrition compromise r/t admission due to L femur comminuted s/p open treatment of a subtrochanteric fracture with a CMN on 3/3 and underlying medical condition (no past medical history on file). Malnutrition Assessment:  Malnutrition Status: At risk for malnutrition (Comment) (If intake and appetite remain poor)    Context:  Acute Illness     Findings of the 6 clinical characteristics of malnutrition:  Energy Intake:  Mild decrease in energy intake (Comment) (poor appetite due to hospitalization)  Weight Loss:  No significant weight loss     Body Fat Loss:  No significant body fat loss     Muscle Mass Loss:  No significant muscle mass loss    Fluid Accumulation:  No significant fluid accumulation     Strength:  Not Performed    Estimated Daily Nutrient Needs:  Energy (kcal):  5940-4804 kcal/day (25-30 kcal/kg); Weight Used for Energy Requirements:  Current     Protein (g):  65-75 g/day (1.3-1.5 g/kg IBW); Weight Used for Protein Requirements:  Ideal        Fluid (ml/day):  4564-8587 ml/day; Method Used for Fluid Requirements:  1 ml/kcal      Nutrition Related Findings:       Pt. Report/Treatments/Miscellaneous: Spoke with patient at chairside this afternoon. Pt irritated at time of assessment due to unknown next steps regarding her plan of care. Pt states appetite decreased but intake was normal/good prior to admission.  Provided patient with osteoporosis nutrition therapy as patient and  asking how much calcium and vitamin D she should have in a day.   GI Status: last BM 3/7, pt denies N/V/C/D  Pertinent Labs: Na 136, K 4.7, BUN 11, creatinine 0.6, glucose 96, hgb 8.1  Pertinent Meds: colace, glycolax, sodium chloride, flexeril, norco     Wounds:  None       Current Nutrition Therapies:    ADULT DIET; Regular    Anthropometric Measures:  · Height: 5' 2\" (157.5 cm)  · Current Body Weight: 128 lb 11.2 oz (58.4 kg) (weight taken from 3/7. Edema present on 3/11: RLE nonpitting, LLE +2 nonpitting)   · Admission Body Weight: 130 lb (59 kg) (3/3: stated weight)    · Usual Body Weight: 130 lb (59 kg) (stated by patient)     · Ideal Body Weight: 110 lbs; % Ideal Body Weight 117 %   · BMI: 23.5  · Adjusted Body Weight:  ; No Adjustment   · BMI Categories: Normal Weight (BMI 22.0 to 24.9) age over 72       Nutrition Diagnosis:   · Increased nutrient needs related to increase demand for energy/nutrients as evidenced by other (comment) (aging resulting in weaker bones.)    Nutrition Interventions:   Food and/or Nutrient Delivery:  Continue Current Diet  Nutrition Education/Counseling:  Education initiated (discussed calcium and vitamin D, where its found, how much to consume. Provided patient with handout.)   Coordination of Nutrition Care:  Continue to monitor while inpatient    Goals:  Pt will consume greater than 75% of meals during LOS and demonstrate understanding of education provided.        Nutrition Monitoring and Evaluation:   Behavioral-Environmental Outcomes:  None Identified   Food/Nutrient Intake Outcomes:  Food and Nutrient Intake,Diet Advancement/Tolerance  Physical Signs/Symptoms Outcomes:  Biochemical Data,GI Status,Weight,Skin,Nutrition Focused Physical Findings     Discharge Planning:    No discharge needs at this time     Electronically signed by Anurag May RD on 3/11/22 at 2:10 PM EST    Contact: [phone] no

## 2022-08-12 NOTE — OB PROVIDER H&P - NSPRENATALCARE_OBGYN_ALL_OB
Decrease Metoprolol to 25 mg by mouth at night.   To help manage her symptoms patient was advised to:  1.  Increase salt intake. Consider use of oral rehydration beverages with electrolytes such as Pedialyte, salt tabs (recommend 3-6 grams of salt daily) (Vitassium, Liquid IV, NUUN)  2.  Increase hydration. (continue at least 2-3 L water daily)  3.  Avoid caffeine and alcohol.  4.  Avoid prolonged standing.  5.  The patient advised to heed warning symptoms and immediately lie down and elevate legs above the heart if symptoms were to come on.  6. We also discussed that some patients with POTS benefit from exercise regimen due to increase in plasma volume. Recumbent exercise such as recumbent bicycle are preferable due to orthostatic symptoms.  Evidence suggests that 15-20 minutes of cardiovascular exercise daily may increase plasma volume and patients performing exercise had improvement in symptoms.    7. Advised to use compression stockings, thigh high are more effective but would recommend at least knee high.  8. Include potassium in daily diet  9. Add magnesium/magnesium supplement in daily diet  10. Isometric exercises      Yes

## 2022-12-06 NOTE — OB PROVIDER H&P - NS_AMNISURE_OBGYN_ALL_OB
Not Done Quality 110: Preventive Care And Screening: Influenza Immunization: Influenza Immunization Administered during Influenza season Quality 431: Preventive Care And Screening: Unhealthy Alcohol Use - Screening: Patient not identified as an unhealthy alcohol user when screened for unhealthy alcohol use using a systematic screening method Detail Level: Detailed Quality 226: Preventive Care And Screening: Tobacco Use: Screening And Cessation Intervention: Patient screened for tobacco use and is an ex/non-smoker

## 2023-01-06 NOTE — OB RN DELIVERY SUMMARY - NSCORDSECONDSA_OBGYN_A_OB_FT
Called son, Triston, and updated on patient transfer to ICU for Bipap need. He verbalizes understanding.    30

## 2023-07-23 NOTE — PACU DISCHARGE NOTE - MENTAL STATUS: PATIENT PARTICIPATION
Awake
DISPLAY PLAN FREE TEXT

## 2024-05-15 NOTE — DISCHARGE NOTE ANTEPARTUM - NURSING SECTION COMPLETE
Called patient regarding iron lab test to be done as soon as possible, she stated she is going Friday and she did receive her Sodium Bicarb.  
The pt called the office concerned about her Sodium Bicarb. She stated that it is on back order at the pharmacy it was sent to, so I offered  to send it to another pharmacy but she declined the offer.  She also was asking when should she redo her Iron panel and I informed her at least a week after starting the medication.   
Patient/Caregiver provided printed discharge information.

## 2024-07-20 ENCOUNTER — NON-APPOINTMENT (OUTPATIENT)
Age: 34
End: 2024-07-20

## 2024-12-20 NOTE — DISCHARGE NOTE OB - NS DC INTERPRETER YES NO
no lesions,  no deformities,  no traumatic injuries,   no masses present, breathing is unlabored without accessory muscle use, normal breath sounds  No

## 2024-12-23 NOTE — OB NEONATOLOGY/PEDIATRICIAN DELIVERY SUMMARY - BABY A: APGAR 1 MIN HEART RATE, DELIVERY
Patient saw her PCP on Friday, who had cancelled the medication before the patient had picked it up from the pharmacy. Please resend   bisacodyl (DULCOLAX) 5 mg EC tablet and polyethylene glycol (GOLYTELY) 4000 mL solution   [facility]/pharmacy #[id number] - [place] [facility] - [address]       (2) more than 100 beats/min

## 2025-06-26 NOTE — OB RN PATIENT PROFILE - PURPOSEFUL PROACTIVE ROUNDING
PDMP reviewed. Chart reviewed, OK to refill. E-prescribed per orders. Angelique Poole MD     Patient